# Patient Record
Sex: FEMALE | Race: WHITE | NOT HISPANIC OR LATINO | Employment: FULL TIME | URBAN - METROPOLITAN AREA
[De-identification: names, ages, dates, MRNs, and addresses within clinical notes are randomized per-mention and may not be internally consistent; named-entity substitution may affect disease eponyms.]

---

## 2020-08-01 ENCOUNTER — APPOINTMENT (OUTPATIENT)
Dept: CT IMAGING | Facility: CLINIC | Age: 55
End: 2020-08-01
Attending: EMERGENCY MEDICINE
Payer: COMMERCIAL

## 2020-08-01 ENCOUNTER — HOSPITAL ENCOUNTER (EMERGENCY)
Facility: CLINIC | Age: 55
Discharge: HOME | End: 2020-08-01
Attending: EMERGENCY MEDICINE | Admitting: EMERGENCY MEDICINE
Payer: COMMERCIAL

## 2020-08-01 VITALS
DIASTOLIC BLOOD PRESSURE: 80 MMHG | HEART RATE: 95 BPM | TEMPERATURE: 97.7 F | OXYGEN SATURATION: 100 % | SYSTOLIC BLOOD PRESSURE: 141 MMHG | RESPIRATION RATE: 18 BRPM

## 2020-08-01 DIAGNOSIS — S02.2XXB OPEN FRACTURE OF NASAL BONE, INITIAL ENCOUNTER: ICD-10-CM

## 2020-08-01 PROCEDURE — 99284 EMERGENCY DEPT VISIT MOD MDM: CPT | Mod: 25

## 2020-08-01 PROCEDURE — 25000128 H RX IP 250 OP 636: Performed by: EMERGENCY MEDICINE

## 2020-08-01 PROCEDURE — 21310 ZZHC CLOSED TX NASAL BONE FRACTURE W/O MANIPULATION: CPT

## 2020-08-01 PROCEDURE — 25000125 ZZHC RX 250: Performed by: EMERGENCY MEDICINE

## 2020-08-01 PROCEDURE — 27110038 ZZH RX 271: Performed by: EMERGENCY MEDICINE

## 2020-08-01 PROCEDURE — 90715 TDAP VACCINE 7 YRS/> IM: CPT | Performed by: EMERGENCY MEDICINE

## 2020-08-01 PROCEDURE — 90471 IMMUNIZATION ADMIN: CPT

## 2020-08-01 PROCEDURE — 25000132 ZZH RX MED GY IP 250 OP 250 PS 637: Performed by: EMERGENCY MEDICINE

## 2020-08-01 PROCEDURE — 12011 RPR F/E/E/N/L/M 2.5 CM/<: CPT | Mod: 59

## 2020-08-01 PROCEDURE — 70486 CT MAXILLOFACIAL W/O DYE: CPT

## 2020-08-01 RX ORDER — METHYLCELLULOSE 4000CPS 30 %
POWDER (GRAM) MISCELLANEOUS ONCE
Status: COMPLETED | OUTPATIENT
Start: 2020-08-01 | End: 2020-08-01

## 2020-08-01 RX ADMIN — AMOXICILLIN AND CLAVULANATE POTASSIUM 1 TABLET: 875; 125 TABLET, FILM COATED ORAL at 21:02

## 2020-08-01 RX ADMIN — CLOSTRIDIUM TETANI TOXOID ANTIGEN (FORMALDEHYDE INACTIVATED), CORYNEBACTERIUM DIPHTHERIAE TOXOID ANTIGEN (FORMALDEHYDE INACTIVATED), BORDETELLA PERTUSSIS TOXOID ANTIGEN (GLUTARALDEHYDE INACTIVATED), BORDETELLA PERTUSSIS FILAMENTOUS HEMAGGLUTININ ANTIGEN (FORMALDEHYDE INACTIVATED), BORDETELLA PERTUSSIS PERTACTIN ANTIGEN, AND BORDETELLA PERTUSSIS FIMBRIAE 2/3 ANTIGEN 0.5 ML: 5; 2; 2.5; 5; 3; 5 INJECTION, SUSPENSION INTRAMUSCULAR at 21:11

## 2020-08-01 RX ADMIN — Medication: at 21:02

## 2020-08-01 RX ADMIN — Medication: at 21:03

## 2020-08-01 ASSESSMENT — ENCOUNTER SYMPTOMS
WOUND: 1
NAUSEA: 0
VOMITING: 0

## 2020-08-01 NOTE — ED PROVIDER NOTES
History     Chief Complaint:  Facial Injury    HPI   Lulú Razo is a 55 year old female who presents with facial injury. The patient was walking up steps when she fell forward hitting the edge of the steps on the bridge of her nose. She states that she is unable to breath out her nose. The patient is unsure if she lost consciousness. The patient denies any nausea or vomiting.     Allergies:  Quinolones     Medications:    Medications reviewed. No current medications.     Past Medical History:    Medical history reviewed. No pertinent medical history.    Past Surgical History:    Surgical history reviewed. No pertinent surgical history.    Family History:    Family history reviewed. No pertinent family history.     Social History:  Current Tobacco user.      Review of Systems   Gastrointestinal: Negative for nausea and vomiting.   Skin: Positive for wound.   All other systems reviewed and are negative.      Physical Exam     Patient Vitals for the past 24 hrs:   BP Temp Temp src Pulse Resp SpO2   08/01/20 1816 (!) 142/108 97.7  F (36.5  C) Oral 104 18 98 %       Physical Exam  Vital signs reviewed.  Nursing note reviewed.  Constitutional: Well-developed, Well-nourished.  Non-diaphoretic.  Mild distress    HENT: Laceration to nasal bridge w/ obvious deformity.  No pain or instability to palpation of bony orbits, mandible, maxilla.  Good jaw opening.  No malocclusion.  No nasal septal hematoma.  OP clear and moist.    EYES:  PERRL.  EOMI.  NECK:  No Cspine tenderness.  Trachea midline.  Full ROM.  Supple. No stridor.  CARDIAC:  RRR.   CHEST:  No external evidence of chest trauma  PULM: Effort  Normal.  Breath sounds clear and equal bilat  ABD:  Soft, NT/ND No guarding, no rebound.   : No CVA T.    BACK:  No T or L spinous process tenderness.  Pelvis stable.  EXT:  Full ROM X4.  No tenderness, edema, crepitus or obvious deformity.    NEURO:  Alert, Oriented X3.  5/5 UE and LE, proximal and distal.  Sensation  intact to LT.   SKIN :  Warm.  Dry.   No erythema.  No rash  PSYCH.:  Normal judgment.  Normal affect.      Emergency Department Course     Imaging:  Radiology findings were communicated with the Patient who voiced understanding of the findings.    CT Facial Bones without Contrast  1.  Comminuted fractures of the nasal bones and fracture of the nasal   septum. Posterior displacement of the nasal bone fracture fragments.     Reading per radiology.    Interventions:  2101 Augmentin, 1 tablet, Oral  2111 Tdap, 0.5 mL, intramuscular    Procedures    Laceration Repair        LACERATION:  A simple clean 2 cm laceration.      LOCATION:  nasal bridge      ANESTHESIA:  LET - Topical      PREPARATION:  Irrigation and Scrubbing with Normal Saline, Betadine and Shur Clens      DEBRIDEMENT:  no debridement and wound explored, no foreign body found      CLOSURE:  Wound was closed with One Layer.  Skin closed with 4 x 5.0 Ethylon using interrupted sutures.      Emergency Department Course:     Nursing notes and vitals reviewed.    1820 I performed an exam of the patient as documented above.     1920 The patient was sent for a CT while in the emergency department, results above.     2016 I performed the laceration repair procedure as documented above.    1958 Findings and plan explained to the Patient. Patient discharged home with instructions regarding supportive care, medications, and reasons to return. The importance of close follow-up was reviewed.    Impression & Plan      Medical Decision Making:  Lulú Razo is a 55 year old female presenting w/ facial/nose injury s/p mechanical fall.     DDx includes mechanical fall, fracture, contusion, intracranial hemorrhage, skull fracture.  Doubt seizure, syncope, CVA given history and physical exam.  No other evidence of trauma on full primary and secondary trauma surveys other than areas imaged above or documented in physical exam.  Given mechanical fall and no other complaints,  EKG and blood work deferred. Imaging sig for nasal bone fractures as described above.  Interventions as noted above.  Laceration repaired as noted above.  Prophylactic abx given 2ndary to open fracture.  At this time I feel the pt is safe for discharge.  Recommendations given regarding follow up with ENT for reduction and suture removal, PCP if unable to see ENT in 7 days, and return to the emergency department as needed for new or worsening symptoms.  Pt and  counseled on all results, disposition and diagnosis.  They are understanding and agreeable to plan. Patient discharged in stable condition.         Diagnosis:    ICD-10-CM    1. Open fracture of nasal bone, initial encounter  S02.2XXB        Disposition:   Patient is discharged home.     Scribe Disclosure:  I, Jessica Moncada, am serving as a scribe at 6:23 PM on 8/1/2020 to document services personally performed by Farrukh Nelson MD based on my observations and the provider's statements to me.  [facility] EMERGENCY DEPARTMENT       Farrukh Nelson MD  08/02/20 4181

## 2020-08-01 NOTE — ED AVS SNAPSHOT
[facility] Emergency Department  [address]  [facility] [address]  Phone:  [phone]  Fax:  [phone]                                    Lulú Razo   MRN: [id number]    Department:  [facility] Emergency Department   Date of Visit:  8/1/2020           After Visit Summary Signature Page    I have received my discharge instructions, and my questions have been answered. I have discussed any challenges I see with this plan with the nurse or doctor.    ..........................................................................................................................................  Patient/Patient Representative Signature      ..........................................................................................................................................  Patient Representative Print Name and Relationship to Patient    ..................................................               ................................................  Date                                   Time    ..........................................................................................................................................  Reviewed by Signature/Title    ...................................................              ..............................................  Date                                               Time          22EPIC Rev 08/18

## 2020-08-01 NOTE — ED TRIAGE NOTES
Patient comes in for evaluation of injury to the nose. Patient tripped and fell forward striking bridge of nose on the corner of a cement step. Patient does not think she lost consciousness. Nose is deformed. ABCs intact.

## 2020-08-02 NOTE — DISCHARGE INSTRUCTIONS
1. Do not get laceration wet for 24 to 48 hours.  After, you may wash gently with warm soapy water.  Do not submerge laceration (ex. Bath tub, pool) until the sutures are removed.  2. You may use ice as needed for swelling.  3. -Take acetaminophen 500 to 1000 mg by mouth every 4 to 6 hours as needed for pain or fever.  Do not take more than 4000 mg in 24 hours.  Do not take within 6 hours of another acetaminophen containing medication such as norco (vicodin) or percocet.  - Take ibuprofen 600 to 800 mg by mouth every 6 to 8 hours as needed for pain or fever  4. Please follow-up in clinic in 10 days for suture removal. There were 4 sutures placed.  5. Please return to the ED as needed for new or worsening symptoms such as redness to surrounding tissue, vomiting and unable to keep anything down, severe and uncontrollable headache, confusion, draining pus, fever, multiple episodes of vomiting, any other concerning symptoms.    Please apply high SPF sunscreen (50 or higher) to laceration for 3-6 months after healing to help prevent scar formation.      Discharge Instructions  Laceration (Cut)    You were seen today for a laceration (cut).  Your provider examined your laceration for any problems such a buried foreign body (like glass, a splinter, or gravel), or injury to blood vessels, tendons, and nerves.  Your provider may have also rinsed and/or scrubbed your laceration to help prevent an infection. It may not be possible to find all problems with your laceration on the first visit; occasionally foreign bodies or a tendon injury can go undetected.    Your laceration may have been closed in one of several ways:    Stitches: regular stitches that require removal.      A small percentage of wounds will develop an infection regardless of how well the wound is cared for. Antibiotics are generally not indicated to prevent an infection so are only given for a small number of high-risk wounds. Some lacerations are too high  risk to close, and are left open to heal because closure can increase the likelihood that an infection will develop.    Remember that all lacerations, no matter how expertly repaired, will cause scarring. We consider many factors, techniques, and materials, in our efforts to provide the best possible cosmetic outcome.    Generally, every Emergency Department visit should have a follow-up clinic visit with either a primary or a specialty clinic/provider. Please follow-up as instructed by your emergency provider today.     Return to the Emergency Department right away if:    You have more redness, swelling, pain, drainage (pus), a bad smell, or red streaking from your laceration as these symptoms could indicate an infection.    You have a fever of 100.4 F or more.    You have bleeding that you cannot stop at home. If your cut starts to bleed, hold pressure on the bleeding area with a clean cloth or put pressure over the bandage.  If the bleeding does not stop after using constant pressure for 30 minutes, you should return to the Emergency Department for further treatment.    Return to the Emergency Department or see your regular provider if:    The laceration starts to come open.     You have something coming out of the cut or a feeling that there is something in the laceration.    Your wound will not heal, or keeps breaking open. There can always be glass, wood, dirt or other things in any wound.  They will not always show up, even on x-rays.  If a wound does not heal, this may be why, and it is important to follow-up with your regular provider.    Home Care:    Take your dressing off in 12-24 hours, or as instructed by your provider, to check your laceration. Remove the dressing sooner if it seems too tight or painful, or if it is getting numb, tingly, or pale past the dressing.    Gently wash your laceration 1-2 times daily with clean water and mild soap. It is okay to shower or run clean water over the laceration,  but do not let the laceration soak in water (no swimming).    For all other repairs: after you wash your laceration, or at least 2 times a day, apply antibiotic ointment (such as Neosporin  or Bacitracin ) to the laceration, then cover it with a Band-Aid  or gauze.Keep the laceration clean.     Follow-up for removal:    If your wound was closed with staples or regular stitches, they need to be removed according to the instructions and timeline specified by your provider today.    If your wound was closed with absorbable ( dissolving ) sutures, they should fall out, dissolve, or not be visible in about one week. If they are still visible, then they should be removed according to the instructions and timeline specified by your provider today.    Scars:  To help minimize scarring:    Wear sunscreen over the healed laceration when out in the sun.    Massage the area regularly once healed.    You may apply Vitamin E to the healed wound.    Wait. Scars improve in appearance over months and years.    If you were given a prescription for medicine here today, be sure to read all of the information (including the package insert) that comes with your prescription.  This will include important information about the medicine, its side effects, and any warnings that you need to know about.  The pharmacist who fills the prescription can provide more information and answer questions you may have about the medicine.  If you have questions or concerns that the pharmacist cannot address, please call or return to the Emergency Department.       Remember that you can always come back to the Emergency Department if you are not able to see your regular provider in the amount of time listed above, if you get any new symptoms, or if there is anything that worries you.

## 2020-08-11 ENCOUNTER — OFFICE VISIT (OUTPATIENT)
Dept: FAMILY MEDICINE | Facility: CLINIC | Age: 55
End: 2020-08-11
Payer: COMMERCIAL

## 2020-08-11 VITALS
WEIGHT: 202 LBS | OXYGEN SATURATION: 95 % | TEMPERATURE: 98.6 F | DIASTOLIC BLOOD PRESSURE: 90 MMHG | HEART RATE: 98 BPM | SYSTOLIC BLOOD PRESSURE: 128 MMHG

## 2020-08-11 DIAGNOSIS — Z01.818 PREOP GENERAL PHYSICAL EXAM: Primary | ICD-10-CM

## 2020-08-11 DIAGNOSIS — S02.2XXB OPEN FRACTURE OF NASAL BONE, INITIAL ENCOUNTER: ICD-10-CM

## 2020-08-11 LAB — HGB BLD-MCNC: 14.8 G/DL (ref 11.7–15.7)

## 2020-08-11 PROCEDURE — 99203 OFFICE O/P NEW LOW 30 MIN: CPT | Performed by: FAMILY MEDICINE

## 2020-08-11 PROCEDURE — 36415 COLL VENOUS BLD VENIPUNCTURE: CPT | Performed by: FAMILY MEDICINE

## 2020-08-11 PROCEDURE — 85018 HEMOGLOBIN: CPT | Performed by: FAMILY MEDICINE

## 2020-08-11 NOTE — PROGRESS NOTES
[facility]  [address] [address]  [phone]  Dept: [phone]    PRE-OP EVALUATION:  Today's date: 2020    Lulú Razo (: 1965) presents for pre-operative evaluation assessment as requested by Dr. Nova.  She requires evaluation and anesthesia risk assessment prior to undergoing surgery/procedure for treatment of nasal fracture.    Proposed Surgery/ Procedure: repair nasal fracture  Date of Surgery/ Procedure: 2020  Time of Surgery/ Procedure: [facility]/Surgical Facility: [facility]  Surgery Fax Number: Note does not need to be faxed, will be available electronically in Epic.  Primary Physician: Ashley Mckay Bryant  Type of Anesthesia Anticipated: to be determined    Preoperative Questionnaire:   No - Have you ever had a heart attack or stroke?  No - Have you ever had surgery on your heart or blood vessels, such as a stent, coronary (heart) bypass, or surgery on an artery in the head, neck, heart, or legs?  No - Do you have chest pain when you are physically active?  No - Do you have a history of heart failure?  no - Do you currently have a cold, bronchitis, or symptoms of other respiratory (head and chest) infections? Neg COVID test 8/10/2020  no - Do you have a cough, shortness of breath, or wheezing?  No - Do you or anyone in your family have a history of blood clots?  No - Do you or anyone in your family have a serious bleeding problem, such as long-lasting bleeding after surgeries or cuts?  No - Have you ever had anemia or been told to take iron pills?  No - Have you had any abnormal blood loss such as black, tarry or bloody stools, or abnormal vaginal bleeding?  No - Have you ever had a blood transfusion?  Yes - Are you willing to have a blood transfusion if it is medically needed before, during, or after your surgery?  YES - Have you or anyone in your family ever had problems with anesthesia (sedation for  surgery)?  no - Do you have sleep apnea, excessive snoring, or daytime drowsiness?   No - Do you have any artifical heart valves or other implanted medical devices, such as a pacemaker, defibrillator, or continuous glucose monitor?  No - Do you have any artifical joints?  No - Are you allergic to latex?  No - Is there any chance that you may be pregnant?    Patient has a Health Care Directive or Living Will:  NO    HPI:     HPI related to upcoming procedure:       See problem list for active medical problems.  Problems all longstanding and stable, except as noted/documented.  See ROS for pertinent symptoms related to these conditions.      MEDICAL HISTORY:   There are no active problems to display for this patient.     No past medical history on file.  No past surgical history on file.  No current outpatient medications on file.     OTC products: None, except as noted above    Allergies   Allergen Reactions     Quinolones Hives      Latex Allergy: NO    Social History     Tobacco Use     Smoking status: Current Every Day Smoker     Smokeless tobacco: Never Used   Substance Use Topics     Alcohol use: Yes     History   Drug Use Unknown       REVIEW OF SYSTEMS:   CONSTITUTIONAL: NEGATIVE for fever, chills, change in weight  ENT/MOUTH: NEGATIVE for ear, mouth and throat problems  RESP: NEGATIVE for significant cough or SOB  CV: NEGATIVE for chest pain, palpitations or peripheral edema    EXAM:   BP (!) 128/90   Pulse 98   Temp 98.6  F (37  C) (Tympanic)   Wt 91.6 kg (202 lb)   SpO2 95%   GENERAL APPEARANCE: healthy, alert and no distress  HENT: ear canals and TM's normal and nose and mouth without ulcers or lesions  RESP: lungs clear to auscultation - no rales, rhonchi or wheezes  CV: regular rate and rhythm, normal S1 S2, no S3 or S4 and no murmur, click or rub   ABDOMEN: soft, nontender, no HSM or masses and bowel sounds normal  NEURO: Normal strength and tone, sensory exam grossly normal, mentation intact and  speech normal    DIAGNOSTICS:   Hemoglobin done   NO EKG indicated    IMPRESSION:   Reason for surgery/procedure:   Diagnosis/reason for consult:     ICD-10-CM    1. Preop general physical exam  Z01.818 Hemoglobin   2. Open fracture of nasal bone, initial encounter  S02.2XXB Hemoglobin         The proposed surgical procedure is considered LOW risk.    REVISED CARDIAC RISK INDEX  The patient has the following serious cardiovascular risks for perioperative complications such as (MI, PE, VFib and 3  AV Block):  No serious cardiac risks  INTERPRETATION: 0 risks: Class I (very low risk - 0.4% complication rate)    The patient has the following additional risks for perioperative complications:  No identified additional risks      ICD-10-CM    1. Preop general physical exam  Z01.818 Hemoglobin   2. Open fracture of nasal bone, initial encounter  S02.2XXB Hemoglobin       RECOMMENDATIONS:         APPROVAL GIVEN to proceed with proposed procedure, without further diagnostic evaluation       Signed Electronically by: Madi Jane MD    Copy of this evaluation report is provided to requesting physician.    [facility] Preop Guidelines    Revised Cardiac Risk Index

## 2020-12-19 ENCOUNTER — HOSPITAL ENCOUNTER (OUTPATIENT)
Facility: CLINIC | Age: 55
Setting detail: OBSERVATION
Discharge: HOME | End: 2020-12-20
Attending: EMERGENCY MEDICINE | Admitting: INTERNAL MEDICINE
Payer: COMMERCIAL

## 2020-12-19 ENCOUNTER — APPOINTMENT (OUTPATIENT)
Dept: GENERAL RADIOLOGY | Facility: CLINIC | Age: 55
End: 2020-12-19
Attending: EMERGENCY MEDICINE
Payer: COMMERCIAL

## 2020-12-19 DIAGNOSIS — M65.949 FLEXOR TENOSYNOVITIS OF FINGER: ICD-10-CM
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
## 2020-12-19 DIAGNOSIS — S61.259A CAT BITE OF FINGER, INITIAL ENCOUNTER: ICD-10-CM

## 2020-12-19 LAB
ANION GAP SERPL CALCULATED.3IONS-SCNC: 4 MMOL/L (ref 3–14)
BASOPHILS # BLD AUTO: 0 10E9/L (ref 0–0.2)
BASOPHILS NFR BLD AUTO: 0.2 %
BUN SERPL-MCNC: 11 MG/DL (ref 7–30)
CALCIUM SERPL-MCNC: 8.6 MG/DL (ref 8.5–10.1)
CHLORIDE SERPL-SCNC: 108 MMOL/L (ref 94–109)
CO2 SERPL-SCNC: 28 MMOL/L (ref 20–32)
CREAT SERPL-MCNC: 0.62 MG/DL (ref 0.52–1.04)
DIFFERENTIAL METHOD BLD: ABNORMAL
EOSINOPHIL # BLD AUTO: 0.1 10E9/L (ref 0–0.7)
EOSINOPHIL NFR BLD AUTO: 1 %
ERYTHROCYTE [DISTWIDTH] IN BLOOD BY AUTOMATED COUNT: 11.9 % (ref 10–15)
FLUAV+FLUBV RNA SPEC QL NAA+PROBE: NEGATIVE
FLUAV+FLUBV RNA SPEC QL NAA+PROBE: NEGATIVE
GFR SERPL CREATININE-BSD FRML MDRD: >90 ML/MIN/{1.73_M2}
GLUCOSE SERPL-MCNC: 155 MG/DL (ref 70–99)
HCG SERPL QL: NEGATIVE
HCT VFR BLD AUTO: 41.3 % (ref 35–47)
HGB BLD-MCNC: 13.7 G/DL (ref 11.7–15.7)
IMM GRANULOCYTES # BLD: 0 10E9/L (ref 0–0.4)
IMM GRANULOCYTES NFR BLD: 0.3 %
LABORATORY COMMENT REPORT: NORMAL
LYMPHOCYTES # BLD AUTO: 2.6 10E9/L (ref 0.8–5.3)
LYMPHOCYTES NFR BLD AUTO: 22.7 %
MCH RBC QN AUTO: 31.6 PG (ref 26.5–33)
MCHC RBC AUTO-ENTMCNC: 33.2 G/DL (ref 31.5–36.5)
MCV RBC AUTO: 95 FL (ref 78–100)
MONOCYTES # BLD AUTO: 0.6 10E9/L (ref 0–1.3)
MONOCYTES NFR BLD AUTO: 5.5 %
NEUTROPHILS # BLD AUTO: 7.9 10E9/L (ref 1.6–8.3)
NEUTROPHILS NFR BLD AUTO: 70.3 %
NRBC # BLD AUTO: 0 10*3/UL
NRBC BLD AUTO-RTO: 0 /100
PLATELET # BLD AUTO: 205 10E9/L (ref 150–450)
POTASSIUM SERPL-SCNC: 3.6 MMOL/L (ref 3.4–5.3)
RBC # BLD AUTO: 4.34 10E12/L (ref 3.8–5.2)
RSV RNA SPEC QL NAA+PROBE: NEGATIVE
SARS-COV-2 RNA SPEC QL NAA+PROBE: NEGATIVE
SODIUM SERPL-SCNC: 140 MMOL/L (ref 133–144)
SPECIMEN SOURCE: NORMAL
WBC # BLD AUTO: 11.2 10E9/L (ref 4–11)

## 2020-12-19 PROCEDURE — 99285 EMERGENCY DEPT VISIT HI MDM: CPT | Mod: 25

## 2020-12-19 PROCEDURE — 96365 THER/PROPH/DIAG IV INF INIT: CPT

## 2020-12-19 PROCEDURE — 99220 PR INITIAL OBSERVATION CARE,LEVEL III: CPT | Performed by: PHYSICIAN ASSISTANT

## 2020-12-19 PROCEDURE — 84703 CHORIONIC GONADOTROPIN ASSAY: CPT | Performed by: EMERGENCY MEDICINE

## 2020-12-19 PROCEDURE — G0378 HOSPITAL OBSERVATION PER HR: HCPCS

## 2020-12-19 PROCEDURE — 85025 COMPLETE CBC W/AUTO DIFF WBC: CPT | Performed by: EMERGENCY MEDICINE

## 2020-12-19 PROCEDURE — 87636 SARSCOV2 & INF A&B AMP PRB: CPT | Performed by: EMERGENCY MEDICINE

## 2020-12-19 PROCEDURE — C9803 HOPD COVID-19 SPEC COLLECT: HCPCS

## 2020-12-19 PROCEDURE — 258N000003 HC RX IP 258 OP 636: Performed by: PHYSICIAN ASSISTANT

## 2020-12-19 PROCEDURE — 80048 BASIC METABOLIC PNL TOTAL CA: CPT | Performed by: EMERGENCY MEDICINE

## 2020-12-19 PROCEDURE — 96366 THER/PROPH/DIAG IV INF ADDON: CPT

## 2020-12-19 PROCEDURE — 73140 X-RAY EXAM OF FINGER(S): CPT | Mod: LT

## 2020-12-19 PROCEDURE — 250N000011 HC RX IP 250 OP 636: Performed by: EMERGENCY MEDICINE

## 2020-12-19 RX ORDER — NALOXONE HYDROCHLORIDE 0.4 MG/ML
0.4 INJECTION, SOLUTION INTRAMUSCULAR; INTRAVENOUS; SUBCUTANEOUS
Status: DISCONTINUED | OUTPATIENT
Start: 2020-12-19 | End: 2020-12-20 | Stop reason: HOSPADM

## 2020-12-19 RX ORDER — IBUPROFEN 600 MG/1
600 TABLET, FILM COATED ORAL EVERY 6 HOURS PRN
Status: DISCONTINUED | OUTPATIENT
Start: 2020-12-19 | End: 2020-12-20 | Stop reason: HOSPADM

## 2020-12-19 RX ORDER — HYDROMORPHONE HYDROCHLORIDE 1 MG/ML
0.5 INJECTION, SOLUTION INTRAMUSCULAR; INTRAVENOUS; SUBCUTANEOUS ONCE
Status: DISCONTINUED | OUTPATIENT
Start: 2020-12-19 | End: 2020-12-19

## 2020-12-19 RX ORDER — OXYCODONE HYDROCHLORIDE 5 MG/1
5-10 TABLET ORAL
Status: DISCONTINUED | OUTPATIENT
Start: 2020-12-19 | End: 2020-12-20 | Stop reason: HOSPADM

## 2020-12-19 RX ORDER — AMPICILLIN AND SULBACTAM 2; 1 G/1; G/1
3 INJECTION, POWDER, FOR SOLUTION INTRAMUSCULAR; INTRAVENOUS EVERY 6 HOURS
Status: DISCONTINUED | OUTPATIENT
Start: 2020-12-20 | End: 2020-12-20

## 2020-12-19 RX ORDER — AMOXICILLIN 250 MG
1 CAPSULE ORAL 2 TIMES DAILY PRN
Status: DISCONTINUED | OUTPATIENT
Start: 2020-12-19 | End: 2020-12-20 | Stop reason: HOSPADM

## 2020-12-19 RX ORDER — SODIUM CHLORIDE, SODIUM LACTATE, POTASSIUM CHLORIDE, CALCIUM CHLORIDE 600; 310; 30; 20 MG/100ML; MG/100ML; MG/100ML; MG/100ML
INJECTION, SOLUTION INTRAVENOUS CONTINUOUS
Status: DISCONTINUED | OUTPATIENT
Start: 2020-12-19 | End: 2020-12-20

## 2020-12-19 RX ORDER — ACETAMINOPHEN 325 MG/1
975 TABLET ORAL 3 TIMES DAILY
Status: DISCONTINUED | OUTPATIENT
Start: 2020-12-20 | End: 2020-12-20 | Stop reason: HOSPADM

## 2020-12-19 RX ORDER — ACETAMINOPHEN 650 MG/1
650 SUPPOSITORY RECTAL EVERY 4 HOURS PRN
Status: DISCONTINUED | OUTPATIENT
Start: 2020-12-19 | End: 2020-12-20 | Stop reason: HOSPADM

## 2020-12-19 RX ORDER — AMOXICILLIN 250 MG
2 CAPSULE ORAL 2 TIMES DAILY PRN
Status: DISCONTINUED | OUTPATIENT
Start: 2020-12-19 | End: 2020-12-20 | Stop reason: HOSPADM

## 2020-12-19 RX ORDER — ONDANSETRON 2 MG/ML
4 INJECTION INTRAMUSCULAR; INTRAVENOUS EVERY 6 HOURS PRN
Status: DISCONTINUED | OUTPATIENT
Start: 2020-12-19 | End: 2020-12-20 | Stop reason: HOSPADM

## 2020-12-19 RX ORDER — LORAZEPAM 2 MG/ML
0.5 INJECTION INTRAMUSCULAR EVERY 6 HOURS PRN
Status: DISCONTINUED | OUTPATIENT
Start: 2020-12-19 | End: 2020-12-20 | Stop reason: HOSPADM

## 2020-12-19 RX ORDER — ONDANSETRON 4 MG/1
4 TABLET, ORALLY DISINTEGRATING ORAL EVERY 6 HOURS PRN
Status: DISCONTINUED | OUTPATIENT
Start: 2020-12-19 | End: 2020-12-20 | Stop reason: HOSPADM

## 2020-12-19 RX ORDER — POLYETHYLENE GLYCOL 3350 17 G/17G
17 POWDER, FOR SOLUTION ORAL DAILY PRN
Status: DISCONTINUED | OUTPATIENT
Start: 2020-12-19 | End: 2020-12-20 | Stop reason: HOSPADM

## 2020-12-19 RX ORDER — PROCHLORPERAZINE MALEATE 10 MG
10 TABLET ORAL EVERY 6 HOURS PRN
Status: DISCONTINUED | OUTPATIENT
Start: 2020-12-19 | End: 2020-12-20 | Stop reason: HOSPADM

## 2020-12-19 RX ORDER — NALOXONE HYDROCHLORIDE 0.4 MG/ML
0.2 INJECTION, SOLUTION INTRAMUSCULAR; INTRAVENOUS; SUBCUTANEOUS
Status: DISCONTINUED | OUTPATIENT
Start: 2020-12-19 | End: 2020-12-20 | Stop reason: HOSPADM

## 2020-12-19 RX ORDER — PROCHLORPERAZINE 25 MG
25 SUPPOSITORY, RECTAL RECTAL EVERY 12 HOURS PRN
Status: DISCONTINUED | OUTPATIENT
Start: 2020-12-19 | End: 2020-12-20 | Stop reason: HOSPADM

## 2020-12-19 RX ORDER — HYDROMORPHONE HYDROCHLORIDE 1 MG/ML
0.3 INJECTION, SOLUTION INTRAMUSCULAR; INTRAVENOUS; SUBCUTANEOUS
Status: DISCONTINUED | OUTPATIENT
Start: 2020-12-19 | End: 2020-12-20 | Stop reason: HOSPADM

## 2020-12-19 RX ORDER — AMPICILLIN AND SULBACTAM 2; 1 G/1; G/1
3 INJECTION, POWDER, FOR SOLUTION INTRAMUSCULAR; INTRAVENOUS ONCE
Status: COMPLETED | OUTPATIENT
Start: 2020-12-19 | End: 2020-12-19

## 2020-12-19 RX ORDER — LABETALOL 20 MG/4 ML (5 MG/ML) INTRAVENOUS SYRINGE
10
Status: DISCONTINUED | OUTPATIENT
Start: 2020-12-19 | End: 2020-12-20 | Stop reason: HOSPADM

## 2020-12-19 RX ORDER — LIDOCAINE 40 MG/G
CREAM TOPICAL
Status: DISCONTINUED | OUTPATIENT
Start: 2020-12-19 | End: 2020-12-20 | Stop reason: HOSPADM

## 2020-12-19 RX ADMIN — AMPICILLIN SODIUM AND SULBACTAM SODIUM 3 G: 2; 1 INJECTION, POWDER, FOR SOLUTION INTRAMUSCULAR; INTRAVENOUS at 20:57

## 2020-12-19 RX ADMIN — SODIUM CHLORIDE, POTASSIUM CHLORIDE, SODIUM LACTATE AND CALCIUM CHLORIDE: 600; 310; 30; 20 INJECTION, SOLUTION INTRAVENOUS at 23:45

## 2020-12-19 ASSESSMENT — ENCOUNTER SYMPTOMS
VOMITING: 0
WOUND: 1
FEVER: 0
MYALGIAS: 0

## 2020-12-20 VITALS
TEMPERATURE: 98.3 F | DIASTOLIC BLOOD PRESSURE: 68 MMHG | RESPIRATION RATE: 18 BRPM | HEART RATE: 85 BPM | SYSTOLIC BLOOD PRESSURE: 110 MMHG | OXYGEN SATURATION: 98 %

## 2020-12-20 LAB
ANION GAP SERPL CALCULATED.3IONS-SCNC: 2 MMOL/L (ref 3–14)
BUN SERPL-MCNC: 8 MG/DL (ref 7–30)
CALCIUM SERPL-MCNC: 8.7 MG/DL (ref 8.5–10.1)
CHLORIDE SERPL-SCNC: 108 MMOL/L (ref 94–109)
CO2 SERPL-SCNC: 29 MMOL/L (ref 20–32)
CREAT SERPL-MCNC: 0.6 MG/DL (ref 0.52–1.04)
ERYTHROCYTE [DISTWIDTH] IN BLOOD BY AUTOMATED COUNT: 11.9 % (ref 10–15)
GFR SERPL CREATININE-BSD FRML MDRD: >90 ML/MIN/{1.73_M2}
GLUCOSE SERPL-MCNC: 109 MG/DL (ref 70–99)
HCT VFR BLD AUTO: 38.8 % (ref 35–47)
HGB BLD-MCNC: 12.8 G/DL (ref 11.7–15.7)
MCH RBC QN AUTO: 31.4 PG (ref 26.5–33)
MCHC RBC AUTO-ENTMCNC: 33 G/DL (ref 31.5–36.5)
MCV RBC AUTO: 95 FL (ref 78–100)
PLATELET # BLD AUTO: 183 10E9/L (ref 150–450)
POTASSIUM SERPL-SCNC: 4 MMOL/L (ref 3.4–5.3)
RBC # BLD AUTO: 4.07 10E12/L (ref 3.8–5.2)
SODIUM SERPL-SCNC: 139 MMOL/L (ref 133–144)
WBC # BLD AUTO: 9.4 10E9/L (ref 4–11)

## 2020-12-20 PROCEDURE — 80048 BASIC METABOLIC PNL TOTAL CA: CPT | Performed by: PHYSICIAN ASSISTANT

## 2020-12-20 PROCEDURE — 96376 TX/PRO/DX INJ SAME DRUG ADON: CPT

## 2020-12-20 PROCEDURE — G0378 HOSPITAL OBSERVATION PER HR: HCPCS

## 2020-12-20 PROCEDURE — 99217 PR OBSERVATION CARE DISCHARGE: CPT | Performed by: INTERNAL MEDICINE

## 2020-12-20 PROCEDURE — 36415 COLL VENOUS BLD VENIPUNCTURE: CPT | Performed by: PHYSICIAN ASSISTANT

## 2020-12-20 PROCEDURE — 250N000011 HC RX IP 250 OP 636: Performed by: PHYSICIAN ASSISTANT

## 2020-12-20 PROCEDURE — 85027 COMPLETE CBC AUTOMATED: CPT | Performed by: PHYSICIAN ASSISTANT

## 2020-12-20 PROCEDURE — 250N000013 HC RX MED GY IP 250 OP 250 PS 637: Performed by: PHYSICIAN ASSISTANT

## 2020-12-20 RX ORDER — AMOXICILLIN 250 MG
1 CAPSULE ORAL 2 TIMES DAILY PRN
Qty: 10 TABLET | Refills: 0 | Status: SHIPPED | OUTPATIENT
Start: 2020-12-20 | End: 2021-02-09

## 2020-12-20 RX ORDER — OXYCODONE HYDROCHLORIDE 5 MG/1
5 TABLET ORAL EVERY 6 HOURS PRN
Qty: 6 TABLET | Refills: 0 | Status: SHIPPED | OUTPATIENT
Start: 2020-12-20 | End: 2021-02-09

## 2020-12-20 RX ADMIN — AMPICILLIN SODIUM AND SULBACTAM SODIUM 3 G: 2; 1 INJECTION, POWDER, FOR SOLUTION INTRAMUSCULAR; INTRAVENOUS at 03:04

## 2020-12-20 RX ADMIN — ACETAMINOPHEN 975 MG: 325 TABLET, FILM COATED ORAL at 09:14

## 2020-12-20 RX ADMIN — AMPICILLIN SODIUM AND SULBACTAM SODIUM 3 G: 2; 1 INJECTION, POWDER, FOR SOLUTION INTRAMUSCULAR; INTRAVENOUS at 09:13

## 2020-12-20 ASSESSMENT — ACTIVITIES OF DAILY LIVING (ADL)
HEARING_DIFFICULTY_OR_DEAF: NO
CONCENTRATING,_REMEMBERING_OR_MAKING_DECISIONS_DIFFICULTY: NO
VISION_MANAGEMENT: GLASSES
WEAR_GLASSES_OR_BLIND: YES
FALL_HISTORY_WITHIN_LAST_SIX_MONTHS: YES
WALKING_OR_CLIMBING_STAIRS_DIFFICULTY: NO
NUMBER_OF_TIMES_PATIENT_HAS_FALLEN_WITHIN_LAST_SIX_MONTHS: 1
DIFFICULTY_EATING/SWALLOWING: NO
TOILETING_ISSUES: NO
DRESSING/BATHING_DIFFICULTY: NO
DOING_ERRANDS_INDEPENDENTLY_DIFFICULTY: NO
DIFFICULTY_COMMUNICATING: NO

## 2020-12-20 NOTE — PROGRESS NOTES
Patient discharged from department walking. AVS read to patient and all questions and concerns addressed. Patient educated on signs and symptoms of infection, when to call the doctor, and medications. Patient to follow up with ortho in 5 days. Patient educated on care and dressing changes.

## 2020-12-20 NOTE — CONSULTS
Consult Date:  12/20/2020      HISTORY OF PRESENT ILLNESS:  A 55-year-old right-hand dominant woman, sustaining injury to her left index finger from a cat bite.  Her cat was being put down by a .  During the sedation, the cat became agitated and bit across the area just proximal to the PIP joint.  Over the several hours, she then developed pain, swelling, induration, and erythema.  She was brought to the emergency room, placed on IV antibiotics and admitted overnight.      She reports overnight, the pain has subsided considerably.  Redness and swelling feels diminished.  She is able to move the fingers much better.  She does feel that there is some pressure in the localized area of puncture wounds that has some blistering with purulent and a little hemorrhage underneath, suggestive of the localized, more superficial abscess.  Movement with flexor tendon is tolerated it quite well.  She can come almost down to 80 degrees and near full extension.  No tenderness in the palm.  A little bit of induration and erythema localized along the area of the puncture wounds.      PAST MEDICAL HISTORY:  Otherwise healthy.      ALLERGIES:  QUINOLONES.        She has been afebrile.  Pictures were reviewed from yesterday.      PLAN:  Using sterile technique, the area of the blisters was prepped, and an 18-gauge needle was used to unroof the blisters.  Each blister was unroofed with a scab.  In the superficial skin beneath was some purulence, and purulence was expressed from the area.  Once this was expressed and decompressed, she felt better.  Again, she was able to move it fairly well.  These areas were left open, and she will begin b.i.d. soaks in Hibiclens, try to maintain the area opened, and dressing changes with Adaptic and Coban for edema control.  She has responded well to the antibiotics overnight and likely can be discharged on Augmentin with close followup in clinic.  Questions were answered and will continue to  monitor closely.         STUART BRIDGES MD             D: 2020   T: 2020   MT:       Name:     ARNULFO ROWE   MRN:      [id number]        Account:       [id number]   :      1965           Consult Date:  2020      Document: [id number]

## 2020-12-20 NOTE — DISCHARGE SUMMARY
[facility]  Hospitalist Discharge Summary      Date of Admission:  12/19/2020  Date of Discharge:  12/20/2020  Discharging Provider: Nba Foy DO      Discharge Diagnoses   1.  Cat bite injury to left second finger.  2.  Cat bite cellulitis.  3.  Concern for left second finger tenosynovitis.    Follow-ups Needed After Discharge   Follow-up Appointments     Follow-up and recommended labs and tests       Follow up with orthopedic surgery, within 5 days for hospital follow- up.             Discharge Disposition   Discharged to home  Condition at discharge: Stable      Hospital Course   Lulú Razo is a 55 year old female without prior medical history who presents with left second finger infection due to cat bite.  There was concern for possible left second finger tenosynovitis.  Was placed in the hospital and started on antibiotics with IV Unasyn.  Seen in consultation by orthopedic surgery.  Orthopedic surgery did do bedside incision and drainage.  It is recommended that she transition to oral antibiotics with Augmentin.  Start Augmentin 875/125 mg twice a day for the next 7 days.  Follow-up with orthopedic surgery in the outpatient setting within the next 5 days.    Consultations This Hospital Stay   ORTHOPEDIC SURGERY IP CONSULT    Code Status   Full Code    Time Spent on this Encounter   I spent 20 minutes with Ms. Razo and working on discharge on 12/20/2020.       Nba Foy DO  [facility]  [address] [place] [address]  Phone: [phone]  Fax: [phone]  ______________________________________________________________________    Physical Exam   Vital Signs: Temp: 98.3  F (36.8  C) Temp src: Oral BP: 110/68 Pulse: 85   Resp: 18 SpO2: 98 %      Weight: 0 lbs 0 oz  Gen:  NAD, A&Ox3.  Eyes:  PERRL, sclera anicteric.  OP:  MMM, no lesions.  Neck:  Supple.  CV:  Regular, no murmurs.  Lung:  CTA b/l, normal effort.  Ab:  +BS,  soft.  Skin:  Warm, dry to touch.  Left second finger erythema and swelling.  Ext:  No pitting edema LE b/l.         Primary Care Physician   [facility]    Discharge Orders      Reason for your hospital stay    Cat bite cellulitis.     Follow-up and recommended labs and tests     Follow up with orthopedic surgery, within 5 days for hospital follow- up.     Activity    Your activity upon discharge: activity as tolerated     Full Code     Diet    Follow this diet upon discharge:      Regular Diet Adult           Discharge Medications   Current Discharge Medication List      START taking these medications    Details   amoxicillin-clavulanate (AUGMENTIN) 875-125 MG tablet Take 1 tablet by mouth every 12 hours for 7 days  Qty: 14 tablet, Refills: 0    Associated Diagnoses: Cat bite of finger, initial encounter      oxyCODONE (ROXICODONE) 5 MG tablet Take 1 tablet (5 mg) by mouth every 6 hours as needed for moderate to severe pain (pain control or improvement in physical function.)  Qty: 6 tablet, Refills: 0    Associated Diagnoses: Cat bite of finger, initial encounter      senna-docusate (SENOKOT-S/PERICOLACE) 8.6-50 MG tablet Take 1 tablet by mouth 2 times daily as needed for constipation  Qty: 10 tablet, Refills: 0    Associated Diagnoses: Cat bite of finger, initial encounter           Allergies   Allergies   Allergen Reactions     Quinolones Hives

## 2020-12-20 NOTE — PLAN OF CARE
Pt admitted from ED d/t infected cat bite on 2nd L finger. VSS, max temp 99.6 oral. States finger is throbbing but denies need for pain meds. Wound marked, not extended. Wound continues to appear swollen, red and warm to touch with scant clear drainage. Pt elevated and iced finger overnight. Scheduled IV ABX given. Pt able to rest overnight. Independent with self cares.

## 2020-12-20 NOTE — ED NOTES
[facility]  ED Nurse Handoff Report    Lulú Razo is a 55 year old female   ED Chief complaint: Cat Bite  . ED Diagnosis:   Final diagnoses:   Flexor tenosynovitis of finger   Cat bite of finger, initial encounter     Allergies:   Allergies   Allergen Reactions     Quinolones Hives       Code Status: Full Code  Activity level - Baseline/Home:  Independent. Activity Level - Current:   Independent. Lift room needed: No. Bariatric: No   Needed: No   Isolation: No. Infection: Not Applicable.     Vital Signs:   Vitals:    12/19/20 2011   BP: (!) 160/104   Pulse: 100   Resp: 20   Temp: 98.3  F (36.8  C)   TempSrc: Oral   SpO2: 98%       Cardiac Rhythm:  ,      Pain level:    Patient confused: No. Patient Falls Risk: No.   Elimination Status: has not voided since arrival   Patient Report - Initial Complaint: cat bite . Focused Assessment: patient presents to ED d/t cat bite to L index finger.   Increased swelling. Bite marked.      Tests Performed: BMP, CBC, xray . Abnormal Results:   Fingers XR, 2-3 views, left   Final Result   IMPRESSION: Normal joint spaces and alignment. No fracture. No evidence of osteomyelitis. Soft tissue swelling index finger.           Labs Ordered and Resulted from Time of ED Arrival Up to the Time of Departure from the ED   CBC WITH PLATELETS DIFFERENTIAL - Abnormal; Notable for the following components:       Result Value    WBC 11.2 (*)     All other components within normal limits   BASIC METABOLIC PANEL - Abnormal; Notable for the following components:    Glucose 155 (*)     All other components within normal limits   INFLUENZA A/B & SARS-COV2 PCR MULTIPLEX   HCG QUALITATIVE   PERIPHERAL IV CATHETER     .   Treatments provided:   Unasyn  Family Comments:  aware of admission  OBS brochure/video discussed/provided to patient:  Yes  ED Medications:   Medications   ampicillin-sulbactam (UNASYN) 3 g vial to attach to  mL bag (3 g Intravenous New Bag  12/19/20 2057)   HYDROmorphone (PF) (DILAUDID) injection 0.5 mg (has no administration in time range)     Drips infusing:  Yes  For the majority of the shift, the patient's behavior Green. Interventions performed were NA .    Sepsis treatment initiated: No     Patient tested for COVID 19 prior to admission: YES    ED Nurse Name/Phone Number: Hazel George RN,   9:33 PM

## 2020-12-20 NOTE — PHARMACY-ADMISSION MEDICATION HISTORY
Admission medication history interview status for this patient is complete. See [facility] admission navigator for allergy information, prior to admission medications and immunization status.     Medication history interview done via telephone during Covid-19 pandemic, indicate source(s): Patient  Medication history resources (including written lists, pill bottles, clinic record):None  Pharmacy: Discharge Pharmacy    Patient does not take any prescription/OTC medications at home.    Prior to Admission medications    Not on File

## 2020-12-20 NOTE — DISCHARGE INSTRUCTIONS
Follow up with orthopedic surgery, within 5 days for hospital follow- up.       Discharge Instructions for Cellulitis   You have been diagnosed with cellulitis. This is an infection in the deepest layer of the skin and tissue beneath the skin. In some cases, the infection also affects the muscle. Cellulitis is caused by bacteria. The bacteria can enter the body through broken skin. This can happen with a cut, scratch, animal bite, or an insect bite that has been scratched. You may have been treated in the hospital with antibiotics and fluids. You will likely be given a prescription for antibiotics to take at home. This sheet will help you take care of yourself at home.  Home care  When you are home:    Take the prescribed antibiotic medicine you are given as directed until it is gone. Take it even if you feel better. It treats the infection and stops it from returning. Not taking all the medicine can make future infections hard to treat.    Keep the infected area clean.    When possible, raise the infected area above the level of your heart. This helps keep swelling down.    Talk with your healthcare provider if you are in pain. Ask what kind of over-the-counter medicine you can take for pain.    Apply clean bandages as advised.    Take your temperature once a day for a week.    Wash your hands often to prevent spreading the infection.  In the future, wash your hands before and after you touch cuts, scratches, or bandages. This will help prevent infection.   When to call your healthcare provider  Call your healthcare provider right away if you have any of the following:    Trouble or pain when moving the joints above or below the infected area    Discharge or pus draining from the area    Fever of 100.4 F (38 C) or higher, or as directed by your healthcare provider    Pain that gets worse in or around the infected     Redness that gets worse in or around the infected area, particularly if the area of redness expands  to a wider area    Shaking chills    Swelling of the infected area    Vomiting  Sharan last reviewed this educational content on 11/1/2019 2000-2020 [employer]. [address] [place] [address], [place] [address]. All rights reserved. This information is not intended as a substitute for professional medical care. Always follow your healthcare professional's instructions.

## 2020-12-20 NOTE — H&P
History and Physical     Lulú Razo MRN# [id number]   YOB: 1965 Age: 55 year old      Date of Admission:  12/19/2020    Primary care provider: Ashley Mckay          Assessment and Plan:   Lulú Razo is a 55 year old female without prior medical history who presents with left second finger infection due to cat bite.    Patient was discussed with Dr. Kirkland, who was provider in ED. Chart review of ED work up was reviewed as well as chart review of Care Everywhere, previous visits and admissions.     #Concern for left second finger tenosynovitis  Vaccinated cat bite to left second finger on 12/19 at 1500.  Immediately following bite the finger became swollen, red and warm.  She is not able to bend it.  She is afebrile in the ED with slightly elevated WBC of 11.2.  X-ray of the finger does not show any fracture.  She was started on IV Unasyn and orthopedics was called who may need to do a washout tomorrow.  -Continue IV Unasyn  -Gentle IV fluids at 75 ml/hr  -Ice and elevate hand  -Alternate Tylenol with ibuprofen  -Have oxycodone available as needed  -Orthopedics consult  -Had Tetanus vaccine in 8/2020  -NPO after midnight in case procedure is needed      Social: No concerns  Code: Discussed with patient  and they have chosen full code  VTE prophylaxis: PCDs  Disposition: Observation                    Chief Complaint:   Left 2nd finger cat bite         History of Present Illness:   Lulú Razo is a 55 year old female who presents to the ER after her cat bit her at approximately 3 PM on 12/19.  She reports that she was putting the cat to sleep with a  in her house when it reacted to the sedative and bit her finger.  Shortly after the bite the finger began to swell and she is not able to bend it.  There is also some spreading erythema and warmth.  She denies any systemic symptoms including fever, chills, nausea and vomiting.  She has not been ill recently.  She  does not take any medicines regularly but does not go to the doctor regularly either.  She does not drink alcohol but smokes about 10 cigarettes/day.             Past Medical History:   Denies prior medical history            Past Surgical History:   Hx of prior sinus surgery          Social History:     Social History     Socioeconomic History     Marital status:      Spouse name: Not on file     Number of children: Not on file     Years of education: Not on file     Highest education level: Not on file   Occupational History     Not on file   Social Needs     Financial resource strain: Not on file     Food insecurity     Worry: Not on file     Inability: Not on file     Transportation needs     Medical: Not on file     Non-medical: Not on file   Tobacco Use     Smoking status: Current Every Day Smoker     Smokeless tobacco: Never Used   Substance and Sexual Activity     Alcohol use: Yes     Drug use: Never     Sexual activity: Yes     Partners: Male   Lifestyle     Physical activity     Days per week: Not on file     Minutes per session: Not on file     Stress: Not on file   Relationships     Social connections     Talks on phone: Not on file     Gets together: Not on file     Attends Quaker service: Not on file     Active member of club or organization: Not on file     Attends meetings of clubs or organizations: Not on file     Relationship status: Not on file     Intimate partner violence     Fear of current or ex partner: Not on file     Emotionally abused: Not on file     Physically abused: Not on file     Forced sexual activity: Not on file   Other Topics Concern     Not on file   Social History Narrative     Not on file               Family History:   Family history reviewed and is non contributory         Allergies:      Allergies   Allergen Reactions     Quinolones Hives               Medications:     Prior to Admission medications    Not on File              Review of Systems:   A Comprehensive  greater than 10 system review of systems was carried out.  Pertinent positives and negatives are noted above.  Otherwise negative for contributory information.            Physical Exam:   Blood pressure (!) 160/104, pulse 100, temperature 98.3  F (36.8  C), temperature source Oral, resp. rate 20, SpO2 98 %.  Exam:  GENERAL:  Comfortable.  PSYCH: pleasant, oriented, No acute distress.  HEENT:  PERRLA. Normal conjunctiva, normal hearing, nasal mucosa and Oropharynx are normal.  NECK:  Supple, no neck vein distention, adenopathy or bruits, normal thyroid.  HEART:  Normal S1, S2 with no murmur, no pericardial rub, gallops or S3 or S4.  LUNGS:  Clear to auscultation, normal Respiratory effort. No wheezing, rales or ronchi.  ABDOMEN:  Soft, no hepatosplenomegaly, normal bowel sounds. Non-tender, non distended.   EXTREMITIES: Left second finger is severely swollen and she is unable to bend it with erythema of the finger spreading into the hand.  There is no draining pus from the cat bite site.  SKIN:  Dry to touch, No rash, wound or ulcerations.  NEUROLOGIC:  CN 2-12 grossly intact,  sensation is intact with no focal deficits.               Data:     Recent Labs   Lab 12/19/20 2051   WBC 11.2*   HGB 13.7   HCT 41.3   MCV 95        Recent Labs   Lab 12/19/20 2051      POTASSIUM 3.6   CHLORIDE 108   CO2 28   ANIONGAP 4   *   BUN 11   CR 0.62   GFRESTIMATED >90   GFRESTBLACK >90   EDISON 8.6         Recent Results (from the past 24 hour(s))   Fingers XR, 2-3 views, left    Narrative    EXAM: XR FINGER LT G/E 2 VW  LOCATION: [facility]  DATE/TIME: 12/19/2020 9:10 PM    INDICATION: Cat bite index finger. Cellulitis.  COMPARISON: None.      Impression    IMPRESSION: Normal joint spaces and alignment. No fracture. No evidence of osteomyelitis. Soft tissue swelling index finger.             Kim Babb PA-C

## 2020-12-20 NOTE — CONSULTS
Dictation to follow    Admitted for IV antibiotics for cat bite  4 puncture wounds with blistering and purulence unroofed and debrided  Feeling better after   IV antibiotics over night, less swelling less erythema  Able to move finger much better at PIP and MCP joint    Responding to IV antibiotics, local wound care  Start soaks BID in hibiclens - adaptic and coban dressing  Discharge on Augmentin and will follow up in clinic this week    SLO

## 2020-12-20 NOTE — ED PROVIDER NOTES
History   Chief Complaint:  Cat Bite     HPI   Lulú Razo is a 55 year old female who presents with a cat bite. The patient reports that about 6 hours ago she was putting down her cat who is fully vaccinated. During the euthanasia, the care bit her left index finger twice. She reports increasing pain and swelling since the incident.  The pain has progressively worsened.  It is primary to the proximal portion of the finger but now radiates to the dorsum of the hand.  Pain is markedly aggravated by movement and touch.  She took 500 mg Amoxicillin but has not taken any pain medication. Denies aches, fever, or vomiting. Reports her tetanus is up to date.     Review of Systems   Constitutional: Negative for fever.   Gastrointestinal: Negative for vomiting.   Musculoskeletal: Negative for myalgias.   Skin: Positive for wound (bite).   All other systems reviewed and are negative.      Allergies:  Quinolones    Medications:  None    Past Medical History:    Patient denies any chronic conditions    Social History:     drove to ED     Physical Exam     Patient Vitals for the past 24 hrs:   BP Temp Temp src Pulse Resp SpO2   12/19/20 2011 (!) 160/104 98.3  F (36.8  C) Oral 100 20 98 %       Physical Exam                EYES:   Conjunctiva normal.  NECK:    Supple, no meningismus.   CV:     Regular rate and rhythm     No murmurs, rubs or gallops.       2+ radial pulses bilateral.  PULM:    Clear to auscultation bilateral.       No respiratory distress.      No wheezing, rales or stridor.  ABD:    Soft, non-tender, non-distended.       No rebound or guarding.  MSK:     Left index finger:       Diffuse soft tissue swelling of the digit      Finger held in a semiflexed position      Tenderness all along the flexor tendon      Moderate discomfort with passive extension      Punctate bite wounds noted to the radial and ulnar border overlying the proximal phalanx      Mild diffuse erythema that extends to the  dorsum of the second MCP joint  LYMPH:   No cervical lymphadenopathy.  NEURO:   Alert; good muscle tone     Speech is clear  SKIN:    Warm, dry   PSYCH:    Mood is good and affect is appropriate.      Emergency Department Course     Laboratory:  CBC: WBC 11.2 (H), HGB 13.7,   BMP: glucose 155 (H) o/w WNL (Creatinine 0.62)     Asymptomatic COVID-19 Virus (Coronavirus) by PCR: Pending   Influenza A/B Antigen: Pending    HCG Qualitative: negative     Emergency Department Course:    Reviewed:  I personally reviewed the nursing notes, vitals, past medical history.     Assessments:  : I obtained history from the patient and performed a physical exam.  : I updated the patient with findings and plan for observation admission.     Consults:   : I spoke with Dr. Louis of Ortho  : I spoke with WAYNE Babb of the Hospitalist service     Interventions:  : Unasyn 3 g in  mL IV     Disposition:  The patient was admitted to the hospital under the care of [facility] [place].     Impression & Plan     Medical Decision Makin-year-old female presented with cat bite to her left index finger with progressive swelling and pain.  Patient has features concerning for developing flexor tenosynovitis as noted above.  No indication for rabies prophylaxis.  Patient initiated on IV Unasyn.  X-rays negative for foreign body.  I spoke with the hand surgeon on call and we have agreed that patient will be sent to the observation unit to see if she improves with IV antibiotics alone.  If patient fails to improve, she will likely need operative intervention tomorrow morning.  Patient safe for transfer the floor.    Covid-19  Lulú Razo was evaluated during a global COVID-19 pandemic, which necessitated consideration that the patient might be at risk for infection with the SARS-CoV-2 virus that causes COVID-19.   Applicable protocols for evaluation were followed during the patient's care.   COVID-19 was  considered as part of the patient's evaluation. The plan for testing is:  a test was obtained during this visit.    Diagnosis:    ICD-10-CM    1. Flexor tenosynovitis of finger  M65.9 Asymptomatic Influenza A/B & SARS-CoV2 (COVID-19) Virus PCR Multiplex     CBC + differential     Basic metabolic panel (BMP)     CANCELED: HCG qualitative   2. Cat bite of finger, initial encounter  S61.259A     W55.01XA      Scribe Disclosure:  I, Felisha Deras, am serving as a scribe at 8:34 PM on 12/19/2020 to document services personally performed by Tl Kirkland MD based on my observations and the provider's statements to me.            Tl Kirkland MD  12/19/20 2049

## 2020-12-20 NOTE — ED TRIAGE NOTES
Pt states bitten on 2nd digit LUE earlier today by her cat. Pt states increased pain and swelling since time of bite. Cat was up to date on rabies vaccination per pt. ABCs intact GCS 15

## 2020-12-21 ENCOUNTER — OFFICE VISIT (OUTPATIENT)
Dept: FAMILY MEDICINE | Facility: CLINIC | Age: 55
End: 2020-12-21
Payer: COMMERCIAL

## 2020-12-21 VITALS
HEART RATE: 98 BPM | DIASTOLIC BLOOD PRESSURE: 87 MMHG | RESPIRATION RATE: 16 BRPM | TEMPERATURE: 98 F | WEIGHT: 203.8 LBS | SYSTOLIC BLOOD PRESSURE: 138 MMHG | OXYGEN SATURATION: 98 %

## 2020-12-21 DIAGNOSIS — H93.13 TINNITUS, BILATERAL: Primary | ICD-10-CM

## 2020-12-21 DIAGNOSIS — Z12.11 SCREEN FOR COLON CANCER: ICD-10-CM

## 2020-12-21 DIAGNOSIS — F41.1 GAD (GENERALIZED ANXIETY DISORDER): ICD-10-CM

## 2020-12-21 PROCEDURE — 99214 OFFICE O/P EST MOD 30 MIN: CPT | Performed by: FAMILY MEDICINE

## 2020-12-21 ASSESSMENT — PATIENT HEALTH QUESTIONNAIRE - PHQ9
SUM OF ALL RESPONSES TO PHQ QUESTIONS 1-9: 16
10. IF YOU CHECKED OFF ANY PROBLEMS, HOW DIFFICULT HAVE THESE PROBLEMS MADE IT FOR YOU TO DO YOUR WORK, TAKE CARE OF THINGS AT HOME, OR GET ALONG WITH OTHER PEOPLE: VERY DIFFICULT
SUM OF ALL RESPONSES TO PHQ QUESTIONS 1-9: 16

## 2020-12-21 ASSESSMENT — ANXIETY QUESTIONNAIRES
2. NOT BEING ABLE TO STOP OR CONTROL WORRYING: MORE THAN HALF THE DAYS
6. BECOMING EASILY ANNOYED OR IRRITABLE: NEARLY EVERY DAY
GAD7 TOTAL SCORE: 15
7. FEELING AFRAID AS IF SOMETHING AWFUL MIGHT HAPPEN: MORE THAN HALF THE DAYS
4. TROUBLE RELAXING: NEARLY EVERY DAY
3. WORRYING TOO MUCH ABOUT DIFFERENT THINGS: MORE THAN HALF THE DAYS
5. BEING SO RESTLESS THAT IT IS HARD TO SIT STILL: SEVERAL DAYS
7. FEELING AFRAID AS IF SOMETHING AWFUL MIGHT HAPPEN: MORE THAN HALF THE DAYS
GAD7 TOTAL SCORE: 15
1. FEELING NERVOUS, ANXIOUS, OR ON EDGE: MORE THAN HALF THE DAYS
GAD7 TOTAL SCORE: 15

## 2020-12-21 NOTE — PROGRESS NOTES
Subjective     Lulú Razo is a 55 year old female who presents to clinic today for the following health issues:    History of Present Illness       Mental Health Follow-up:  Patient presents to follow-up on Depression & Anxiety.Patient's depression since last visit has been:  No change  The patient is having other symptoms associated with depression.  Patient's anxiety since last visit has been:  No change  The patient is having other symptoms associated with anxiety.  Any significant life events: No and other  Patient is feeling anxious or having panic attacks.  Patient has no concerns about alcohol or drug use.     Social History  Tobacco Use    Smoking status: Current Every Day Smoker      Packs/day: 5.00      Types: Cigarettes    Smokeless tobacco: Never Used  Alcohol use: Yes    Comment: socially  Drug use: Never      Today's PHQ-9         PHQ-9 Total Score:     (P) 16   PHQ-9 Q9 Thoughts of better off dead/self-harm past 2 weeks :   (P) Not at all   Thoughts of suicide or self harm:      Self-harm Plan:        Self-harm Action:          Safety concerns for self or others:           She eats 0-1 servings of fruits and vegetables daily.She consumes 0 sweetened beverage(s) daily.She exercises with enough effort to increase her heart rate 10 to 19 minutes per day.  She exercises with enough effort to increase her heart rate 3 or less days per week.   She is taking medications regularly.      pt has been worrying about her health, and family, and other things, she recently had nasal injury, and she is worried about her look after his surgery.     Concern - Ringing in ears   Onset: X2 weeks   Description: Patient states that her feels like her ears and the ringing is so load. Patient was up until 3:00am because it was so bad.  Feels the ringing is slightly worse in the Lt.  Intensity: severe  Progression of Symptoms:  Worsening.  Previous history of similar problem: It would come and go but is staying all the  time.  Pt did have hx of occasional ringing in the ears, and some pain in the ear that goes away..  Precipitating factors:        Worsened by: None  Alleviating factors:        Improved by: Patient states that when she puts ear plugs in and listening to water fall sounds.   Therapies tried and outcome: none.  Pt said that her   Said that she has been increasing the volume of the TV.        Review of Systems   CONSTITUTIONAL: NEGATIVE for fever, chills, change in weight  RESP: NEGATIVE for significant cough or SOB  CV: NEGATIVE for chest pain, palpitations or peripheral edema  Skin: no rash  ENT: hard to breath from nose.  Psych: depressed mood and anxiety.  GI: normal.  Lymph : negative.  Neuro: negative.      Objective    /87 (BP Location: Right arm, Patient Position: Sitting, Cuff Size: Adult Large)   Pulse 98   Temp 98  F (36.7  C) (Oral)   Resp 16   Wt 92.4 kg (203 lb 12.8 oz)   SpO2 98%   Breastfeeding No   There is no height or weight on file to calculate BMI.  Physical Exam   GENERAL: healthy, alert and no distress  HENT: ear canals and TM's normal, nose and mouth without ulcers or lesions  NECK: no adenopathy, no asymmetry, masses, or scars and thyroid normal to palpation  RESP: lungs clear to auscultation - no rales, rhonchi or wheezes  NEURO: Normal strength and tone, mentation intact and speech normal  PSYCH: mentation appears normal, affect normal/bright            Assessment & Plan     Screen for colon cancer    - Fecal colorectal cancer screen FIT - Future (S+30); Future    CECILIA (generalized anxiety disorder)  Recurred, especially with the latest changes with COVID, and fall on the face, s/p surgical correction.  At this time, will restart on zzoloft once daily, and refer to therapy  - sertraline (ZOLOFT) 50 MG tablet; Take 1 tablet (50 mg) by mouth daily  - MENTAL HEALTH REFERRAL  - Adult; Outpatient Treatment; Individual/Couples/Family/Group Therapy/Health Psychology; MARTHA: Ashley  [facility] [phone]; We will contact you to schedule the appointment or please call with any questions    Tinnitus, bilateral  With normal hearing test, discussed that the nature is mostly benign, discussed using back ground noise, and use distraction.  Recheck with patient in 1 month.        Tobacco Cessation:   reports that she has been smoking cigarettes. She has been smoking about 5.00 packs per day for the past 0.00 years. She has never used smokeless tobacco.  Not discussed today.       Depression Screening Follow Up    PHQ 12/21/2020   PHQ-9 Total Score 16   Q9: Thoughts of better off dead/self-harm past 2 weeks Not at all                      Return in about 4 weeks (around 1/18/2021) for F2F.    Wanda Christopher MD  [facility]    Answers for HPI/ROS submitted by the patient on 12/21/2020   Chronic problems general questions HPI Form  If you checked off any problems, how difficult have these problems made it for you to do your work, take care of things at home, or get along with other people?: Very difficult  PHQ9 TOTAL SCORE: 16  CECILIA 7 TOTAL SCORE: 15

## 2020-12-21 NOTE — PROGRESS NOTES
HEARING FREQUENCY    Right Ear:      1000 Hz RESPONSE- on Level: 40 db (Conditioning sound)   1000 Hz: RESPONSE- on Level:   20 db    2000 Hz: RESPONSE- on Level:   20 db    4000 Hz: RESPONSE- on Level:   20 db     Left Ear:      4000 Hz: RESPONSE- on Level:   20 db    2000 Hz: RESPONSE- on Level:   20 db    1000 Hz: RESPONSE- on Level: tone not heard    500 Hz: RESPONSE- on Level: tone not heard    Right Ear:    500 Hz: RESPONSE- on Level: 25 db    Hearing Acuity: RESCREEN:  in 1 month. no significant hearing loss seen.    Hearing Assessment: normal

## 2020-12-22 ENCOUNTER — HOSPITAL ENCOUNTER (EMERGENCY)
Facility: CLINIC | Age: 55
Discharge: HOME | End: 2020-12-22
Attending: PHYSICIAN ASSISTANT | Admitting: PHYSICIAN ASSISTANT
Payer: COMMERCIAL

## 2020-12-22 ENCOUNTER — TELEPHONE (OUTPATIENT)
Dept: FAMILY MEDICINE | Facility: CLINIC | Age: 55
End: 2020-12-22

## 2020-12-22 ENCOUNTER — APPOINTMENT (OUTPATIENT)
Dept: GENERAL RADIOLOGY | Facility: CLINIC | Age: 55
End: 2020-12-22
Attending: PHYSICIAN ASSISTANT
Payer: COMMERCIAL

## 2020-12-22 VITALS
TEMPERATURE: 98.1 F | RESPIRATION RATE: 18 BRPM | DIASTOLIC BLOOD PRESSURE: 71 MMHG | OXYGEN SATURATION: 93 % | SYSTOLIC BLOOD PRESSURE: 152 MMHG | WEIGHT: 203 LBS | HEIGHT: 64 IN | HEART RATE: 87 BPM | BODY MASS INDEX: 34.66 KG/M2

## 2020-12-22 DIAGNOSIS — L02.512 ABSCESS OF LEFT INDEX FINGER: ICD-10-CM

## 2020-12-22 DIAGNOSIS — S61.258A CAT BITE OF INDEX FINGER, INITIAL ENCOUNTER: ICD-10-CM
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
## 2020-12-22 LAB
ANION GAP SERPL CALCULATED.3IONS-SCNC: 6 MMOL/L (ref 3–14)
BASOPHILS # BLD AUTO: 0 10E9/L (ref 0–0.2)
BASOPHILS NFR BLD AUTO: 0.3 %
BUN SERPL-MCNC: 10 MG/DL (ref 7–30)
CALCIUM SERPL-MCNC: 9.3 MG/DL (ref 8.5–10.1)
CHLORIDE SERPL-SCNC: 106 MMOL/L (ref 94–109)
CO2 SERPL-SCNC: 26 MMOL/L (ref 20–32)
CREAT SERPL-MCNC: 0.64 MG/DL (ref 0.52–1.04)
CRP SERPL-MCNC: 31.7 MG/L (ref 0–8)
DIFFERENTIAL METHOD BLD: NORMAL
EOSINOPHIL # BLD AUTO: 0 10E9/L (ref 0–0.7)
EOSINOPHIL NFR BLD AUTO: 0.2 %
ERYTHROCYTE [DISTWIDTH] IN BLOOD BY AUTOMATED COUNT: 11.6 % (ref 10–15)
ERYTHROCYTE [SEDIMENTATION RATE] IN BLOOD BY WESTERGREN METHOD: 35 MM/H (ref 0–30)
GFR SERPL CREATININE-BSD FRML MDRD: >90 ML/MIN/{1.73_M2}
GLUCOSE SERPL-MCNC: 118 MG/DL (ref 70–99)
HCT VFR BLD AUTO: 41.6 % (ref 35–47)
HGB BLD-MCNC: 14.2 G/DL (ref 11.7–15.7)
IMM GRANULOCYTES # BLD: 0 10E9/L (ref 0–0.4)
IMM GRANULOCYTES NFR BLD: 0.3 %
LACTATE BLD-SCNC: 0.9 MMOL/L (ref 0.7–2)
LYMPHOCYTES # BLD AUTO: 1.3 10E9/L (ref 0.8–5.3)
LYMPHOCYTES NFR BLD AUTO: 14.4 %
MCH RBC QN AUTO: 32 PG (ref 26.5–33)
MCHC RBC AUTO-ENTMCNC: 34.1 G/DL (ref 31.5–36.5)
MCV RBC AUTO: 94 FL (ref 78–100)
MONOCYTES # BLD AUTO: 0.4 10E9/L (ref 0–1.3)
MONOCYTES NFR BLD AUTO: 4.1 %
NEUTROPHILS # BLD AUTO: 7.4 10E9/L (ref 1.6–8.3)
NEUTROPHILS NFR BLD AUTO: 80.7 %
NRBC # BLD AUTO: 0 10*3/UL
NRBC BLD AUTO-RTO: 0 /100
PLATELET # BLD AUTO: 203 10E9/L (ref 150–450)
POTASSIUM SERPL-SCNC: 3.6 MMOL/L (ref 3.4–5.3)
RBC # BLD AUTO: 4.44 10E12/L (ref 3.8–5.2)
SODIUM SERPL-SCNC: 138 MMOL/L (ref 133–144)
WBC # BLD AUTO: 9.1 10E9/L (ref 4–11)

## 2020-12-22 PROCEDURE — 87070 CULTURE OTHR SPECIMN AEROBIC: CPT | Performed by: PHYSICIAN ASSISTANT

## 2020-12-22 PROCEDURE — 80048 BASIC METABOLIC PNL TOTAL CA: CPT | Performed by: PHYSICIAN ASSISTANT

## 2020-12-22 PROCEDURE — 86140 C-REACTIVE PROTEIN: CPT | Performed by: PHYSICIAN ASSISTANT

## 2020-12-22 PROCEDURE — 85025 COMPLETE CBC W/AUTO DIFF WBC: CPT | Performed by: PHYSICIAN ASSISTANT

## 2020-12-22 PROCEDURE — 99284 EMERGENCY DEPT VISIT MOD MDM: CPT | Mod: 25

## 2020-12-22 PROCEDURE — 83605 ASSAY OF LACTIC ACID: CPT | Performed by: PHYSICIAN ASSISTANT

## 2020-12-22 PROCEDURE — 85652 RBC SED RATE AUTOMATED: CPT | Performed by: PHYSICIAN ASSISTANT

## 2020-12-22 PROCEDURE — 250N000011 HC RX IP 250 OP 636: Performed by: PHYSICIAN ASSISTANT

## 2020-12-22 PROCEDURE — 96366 THER/PROPH/DIAG IV INF ADDON: CPT

## 2020-12-22 PROCEDURE — 96365 THER/PROPH/DIAG IV INF INIT: CPT

## 2020-12-22 PROCEDURE — 73140 X-RAY EXAM OF FINGER(S): CPT | Mod: LT

## 2020-12-22 RX ORDER — AMPICILLIN AND SULBACTAM 2; 1 G/1; G/1
3 INJECTION, POWDER, FOR SOLUTION INTRAMUSCULAR; INTRAVENOUS ONCE
Status: COMPLETED | OUTPATIENT
Start: 2020-12-22 | End: 2020-12-22

## 2020-12-22 RX ADMIN — AMPICILLIN SODIUM AND SULBACTAM SODIUM 3 G: 2; 1 INJECTION, POWDER, FOR SOLUTION INTRAMUSCULAR; INTRAVENOUS at 20:52

## 2020-12-22 ASSESSMENT — ANXIETY QUESTIONNAIRES: GAD7 TOTAL SCORE: 15

## 2020-12-22 ASSESSMENT — ENCOUNTER SYMPTOMS
WOUND: 1
CHILLS: 1
FEVER: 0

## 2020-12-22 ASSESSMENT — MIFFLIN-ST. JEOR: SCORE: 1510.8

## 2020-12-22 ASSESSMENT — PATIENT HEALTH QUESTIONNAIRE - PHQ9: SUM OF ALL RESPONSES TO PHQ QUESTIONS 1-9: 16

## 2020-12-22 NOTE — ED AVS SNAPSHOT
[facility] Emergency Dept  [address]  [facility] [address]  Phone: [phone]  Fax: [phone]                                    Lulú Razo   MRN: [id number]    Department: [facility] Emergency Dept   Date of Visit: 12/22/2020           After Visit Summary Signature Page    I have received my discharge instructions, and my questions have been answered. I have discussed any challenges I see with this plan with the nurse or doctor.    ..........................................................................................................................................  Patient/Patient Representative Signature      ..........................................................................................................................................  Patient Representative Print Name and Relationship to Patient    ..................................................               ................................................  Date                                   Time    ..........................................................................................................................................  Reviewed by Signature/Title    ...................................................              ..............................................  Date                                               Time          22EPIC Rev 08/18

## 2020-12-22 NOTE — TELEPHONE ENCOUNTER
"Received call from pt with concerns of nausea and \"cold\" flashes which started today    Pt is on ABX for cellulitis from a cat bite  Started on zoloft yesterday    Temp 96.7 Pt had had water to drink about 10 min prior  She feels like her whole body gets cold. This has be happening off and on for a couple of hours  No shaking or chills  Her finger is still red, but the swelling has decreased considerably    Advised pt on sx of infection and when to call  Take zoloft with food or at bedtime to help decrease nausea  Side effects usually improve with time  Pt to call the clinic any time 24 hours a day with any other concerns    Pt verbalized understanding and agrees to the plan    Thank you  Nadeen Dee RN on 12/22/2020 at 5:05 PM  "

## 2020-12-23 ENCOUNTER — TRANSFERRED RECORDS (OUTPATIENT)
Dept: HEALTH INFORMATION MANAGEMENT | Facility: CLINIC | Age: 55
End: 2020-12-23

## 2020-12-23 NOTE — DISCHARGE INSTRUCTIONS
Please call the hand surgery clinic (contact information below) to get an appointment for recheck tomorrow.  Please soak the area as instructed previously.  Continue Augmentin. Return immediately for any fevers, worsening symptoms, or any other concerns.

## 2020-12-23 NOTE — ED PROVIDER NOTES
"  History   Chief Complaint:  Wound Check       The history is provided by the patient.      Lulú Razo is a 55 year old female who presents for evaluation for wound check.  She states that she was seen here on 12/19 for a cat bite and was admitted for IV antibiotics, and a bedside I&D was performed.  She was discharged home on Augmentin, and has been taking this medication as prescribed. Today, she started having bouts of nausea and waves of feeling very cold.  She describes this as \"going into a freezer with a tank top on\".  These will last for several minutes, and then dissipate.  She denies any fevers.  She states that she has been soaking and caring for the wound as instructed, but notes that she only soaked her finger once a day.  She is noted some more swelling to the lateral aspect of her digit, but does note that the redness, swelling, and movement in her finger has improved.    Review of Systems   Constitutional: Positive for chills. Negative for fever.   Skin: Positive for wound.   All other systems reviewed and are negative.    Allergies:  Quinolones    Medications:  Sertraline    Past Medical History:    CECILIA    Past Surgical History:    Cholecystectomy  D & C  Abdominal surgery  Oophorectomy  Nose repair    Social History:  The patient was not accompanied to the ED.  Smoking Status: positive  Alcohol Use: occasional    Physical Exam     Patient Vitals for the past 24 hrs:   BP Temp Temp src Pulse Resp SpO2 Height Weight   12/22/20 1805 (!) 178/99 98.1  F (36.7  C) Oral 104 18 94 % 1.626 m (5' 4\") 92.1 kg (203 lb)       Physical Exam  General: Resting comfortably.  Alert and oriented.   Head:  The scalp, face, and head appear normal   Eyes:  Conjunctivae and sclerae are normal    CV:  Radial pulse intact to the left wrist.  Capillary refill is brisk in the distal left index finger.    Resp:  No tachypnea.  No respiratory distress.  MS:  The patient can flex and extend against resistance at the MCP, " DIP, and PIP joints of the left index finger.  Skin:  Healing cat bite wound noted to the left index finger as depicted below.  There is an area on the radial aspect of the digit consistent with forming abscess. There is surrounding erythema to this area, but improvement in erythema overall when comparing to marked area on skin. No drainage.   Neuro:  Sensation intact to distal left index finger.                  Emergency Department Course       Imaging:  Fingers XR, 2-3 views, left   Final Result   IMPRESSION: Soft tissue swelling in the base of the index finger. No soft tissue gas, foreign body or osteomyelitis evident. No evidence of retained tooth fragment.           Laboratory:  Results for orders placed or performed during the hospital encounter of 12/22/20   Fingers XR, 2-3 views, left     Status: None    Narrative    EXAM: XR FINGER LT G/E 2 VW  LOCATION: Health system  DATE/TIME: 12/22/2020 7:38 PM    INDICATION: Cat bite to index finger.  COMPARISON: None.      Impression    IMPRESSION: Soft tissue swelling in the base of the index finger. No soft tissue gas, foreign body or osteomyelitis evident. No evidence of retained tooth fragment.     CBC with platelets differential     Status: None   Result Value Ref Range    WBC 9.1 4.0 - 11.0 10e9/L    RBC Count 4.44 3.8 - 5.2 10e12/L    Hemoglobin 14.2 11.7 - 15.7 g/dL    Hematocrit 41.6 35.0 - 47.0 %    MCV 94 78 - 100 fl    MCH 32.0 26.5 - 33.0 pg    MCHC 34.1 31.5 - 36.5 g/dL    RDW 11.6 10.0 - 15.0 %    Platelet Count 203 150 - 450 10e9/L    Diff Method Automated Method     % Neutrophils 80.7 %    % Lymphocytes 14.4 %    % Monocytes 4.1 %    % Eosinophils 0.2 %    % Basophils 0.3 %    % Immature Granulocytes 0.3 %    Nucleated RBCs 0 0 /100    Absolute Neutrophil 7.4 1.6 - 8.3 10e9/L    Absolute Lymphocytes 1.3 0.8 - 5.3 10e9/L    Absolute Monocytes 0.4 0.0 - 1.3 10e9/L    Absolute Eosinophils 0.0 0.0 - 0.7 10e9/L    Absolute Basophils 0.0 0.0 -  0.2 10e9/L    Abs Immature Granulocytes 0.0 0 - 0.4 10e9/L    Absolute Nucleated RBC 0.0    Basic metabolic panel     Status: Abnormal   Result Value Ref Range    Sodium 138 133 - 144 mmol/L    Potassium 3.6 3.4 - 5.3 mmol/L    Chloride 106 94 - 109 mmol/L    Carbon Dioxide 26 20 - 32 mmol/L    Anion Gap 6 3 - 14 mmol/L    Glucose 118 (H) 70 - 99 mg/dL    Urea Nitrogen 10 7 - 30 mg/dL    Creatinine 0.64 0.52 - 1.04 mg/dL    GFR Estimate >90 >60 mL/min/[1.73_m2]    GFR Estimate If Black >90 >60 mL/min/[1.73_m2]    Calcium 9.3 8.5 - 10.1 mg/dL   Lactic acid whole blood     Status: None   Result Value Ref Range    Lactic Acid 0.9 0.7 - 2.0 mmol/L   Erythrocyte sedimentation rate auto     Status: Abnormal   Result Value Ref Range    Sed Rate 35 (H) 0 - 30 mm/h   CRP inflammation     Status: Abnormal   Result Value Ref Range    CRP Inflammation 31.7 (H) 0.0 - 8.0 mg/L     Procedures  none    Emergency Department Course:    Reviewed:  I reviewed nursing notes, vitals and past medical history.    Assessments:  1841 I initially evaluated the patient in ED room 30.   Patient was updated on plan and discharge plan/instructions.     Consults:   Dr. Temple--Hand surgery    Interventions:  Medications   ampicillin-sulbactam (UNASYN) 3 g vial to attach to  mL bag (3 g Intravenous New Bag 12/22/20 2052)       Disposition:  The patient was discharged to home.     Impression & Plan     Medical Decision Making:  Lulú Razo is a 55 year old female who presents for evaluation of a wound check.  Please refer to the HPI for full details. The patient was seen here on 12/19 for a cat bite to her finger and was admitted for IV antibiotics and a bedside incision and drainage was performed.  She was discharged home on Augmentin.  She is been taking this medication as prescribed.  She has been soaking as instructed.  She has noted severe nausea and chills and noted a swollen area to her finger, prompting her reevaluation.  On  exam, this is consistent with a forming abscess.  X-ray is negative for any acute abnormalities.  There is no fracture or signs of foreign body.  There is no soft tissue gas to suggest gangrene, etc.  Basic labs here are reassuring.  Inflammatory markers are mildly elevated.  Given the pustule, I did touch base with Dr. Temple of hand surgery who suggested that I perform a bedside drainage of the area and have her follow-up in clinic tomorrow for reevaluation.  He also recommended wound culture which is sent and pending and also another dose of IV Unasyn which was administered.  I had the patient soak the finger in warm water which allowed softening and drainage of the pustular area.  I did apply some pressure and cause much pustular content from the wound as I could.  The patient tolerated the procedure well.  There are no immediate complications.  A wound dressing was placed to the area.  Continued soaking was discussed and understood by the patient.  She will call the hand surgery clinic tomorrow to get an appointment for recheck tomorrow.  Red flag symptoms, and reasons return discussed and understood.  All questions were answered prior to discharge.  The patient understands and agrees with plan.    Diagnosis:    ICD-10-CM    1. Abscess of left index finger  L02.512 Wound Culture Aerobic Bacterial   2. Cat bite of index finger, initial encounter  S61.258A     W55.01XA        Discharge Medications:  Continue previously prescribed Augmentin    Scribe Disclosure:  I, Cecile Olmedo, am serving as a scribe at 6:32 PM on 12/22/2020 to document services personally performed by Lola Garcia PA-C based on my observations and the provider's statements to me.     Cecile Olmedo  12/22/2020   [facility] EMERGENCY DEPARTMENT     Lola Garcia PA-C  12/22/20 4299

## 2020-12-23 NOTE — ED TRIAGE NOTES
Pt presents for evaluation of a cat bite. Pt was bit on 12/19/20. Was admitted here overnight and received IV abx. Pt has been taking PO meds as prescribed. Today, started to feel nauseous and chills. Chills comes in waves. Noticed slight increase in redness around site. Pt was bit in left hand.

## 2020-12-23 NOTE — RESULT ENCOUNTER NOTE
[place] ED discharge antibiotic (if prescribed):  None;  Directed to continue Augmentin that was prescribed on 12/20/20 (7 day Rx)  Incision and Drainage performed in [place] ED [Yes / No] : drained in ED after soaking in hot water  No changes in treatment per ED lab result protocol.

## 2020-12-25 LAB
BACTERIA SPEC CULT: NO GROWTH
Lab: NORMAL
SPECIMEN SOURCE: NORMAL

## 2021-01-01 ENCOUNTER — EXTERNAL RECORD (OUTPATIENT)
Dept: OTHER | Age: 56
End: 2021-01-01

## 2021-01-04 ENCOUNTER — HEALTH MAINTENANCE LETTER (OUTPATIENT)
Age: 56
End: 2021-01-04

## 2021-01-20 ENCOUNTER — TELEPHONE (OUTPATIENT)
Dept: FAMILY MEDICINE | Facility: CLINIC | Age: 56
End: 2021-01-20

## 2021-01-20 NOTE — TELEPHONE ENCOUNTER
Needs of attention regarding:  -Cervical Cancer Screening    Health Maintenance Topics with due status: Overdue       Topic Date Due    PREVENTIVE CARE VISIT 1967    MAMMO SCREENING 1965    Pneumococcal Vaccine: Pediatrics (0 to 5 Years) and At-Risk Patients (6 to 64 Years) 02/24/1973    COLORECTAL CANCER SCREENING 02/24/1977    HIV SCREENING 02/24/1982    HEPATITIS C SCREENING 02/24/1985    PAP 02/24/1988    LIPID 02/24/2012    ZOSTER IMMUNIZATION 02/24/2017       Communication:  See MyChart message

## 2021-02-09 ENCOUNTER — OFFICE VISIT (OUTPATIENT)
Dept: FAMILY MEDICINE | Facility: CLINIC | Age: 56
End: 2021-02-09
Payer: COMMERCIAL

## 2021-02-09 VITALS
HEART RATE: 99 BPM | DIASTOLIC BLOOD PRESSURE: 88 MMHG | SYSTOLIC BLOOD PRESSURE: 166 MMHG | BODY MASS INDEX: 35.58 KG/M2 | HEIGHT: 64 IN | TEMPERATURE: 98 F | WEIGHT: 208.4 LBS | OXYGEN SATURATION: 94 %

## 2021-02-09 DIAGNOSIS — Z00.00 ROUTINE GENERAL MEDICAL EXAMINATION AT A HEALTH CARE FACILITY: Primary | ICD-10-CM

## 2021-02-09 DIAGNOSIS — F17.200 SMOKING: ICD-10-CM

## 2021-02-09 DIAGNOSIS — E66.09 CLASS 2 OBESITY DUE TO EXCESS CALORIES WITHOUT SERIOUS COMORBIDITY WITH BODY MASS INDEX (BMI) OF 35.0 TO 35.9 IN ADULT: ICD-10-CM

## 2021-02-09 DIAGNOSIS — E66.812 CLASS 2 OBESITY DUE TO EXCESS CALORIES WITHOUT SERIOUS COMORBIDITY WITH BODY MASS INDEX (BMI) OF 35.0 TO 35.9 IN ADULT: ICD-10-CM

## 2021-02-09 DIAGNOSIS — H93.13 TINNITUS, BILATERAL: ICD-10-CM

## 2021-02-09 PROBLEM — Z90.49 S/P CHOLECYSTECTOMY: Status: ACTIVE | Noted: 2021-02-09

## 2021-02-09 LAB — HBA1C MFR BLD: 5.9 % (ref 0–5.6)

## 2021-02-09 PROCEDURE — 99396 PREV VISIT EST AGE 40-64: CPT | Performed by: FAMILY MEDICINE

## 2021-02-09 PROCEDURE — 86803 HEPATITIS C AB TEST: CPT | Performed by: FAMILY MEDICINE

## 2021-02-09 PROCEDURE — 80061 LIPID PANEL: CPT | Performed by: FAMILY MEDICINE

## 2021-02-09 PROCEDURE — 36415 COLL VENOUS BLD VENIPUNCTURE: CPT | Performed by: FAMILY MEDICINE

## 2021-02-09 PROCEDURE — 87389 HIV-1 AG W/HIV-1&-2 AB AG IA: CPT | Performed by: FAMILY MEDICINE

## 2021-02-09 PROCEDURE — 82947 ASSAY GLUCOSE BLOOD QUANT: CPT | Performed by: FAMILY MEDICINE

## 2021-02-09 PROCEDURE — 83036 HEMOGLOBIN GLYCOSYLATED A1C: CPT | Performed by: FAMILY MEDICINE

## 2021-02-09 ASSESSMENT — ENCOUNTER SYMPTOMS
CHILLS: 1
BREAST MASS: 0

## 2021-02-09 ASSESSMENT — ANXIETY QUESTIONNAIRES
4. TROUBLE RELAXING: MORE THAN HALF THE DAYS
5. BEING SO RESTLESS THAT IT IS HARD TO SIT STILL: NOT AT ALL
GAD7 TOTAL SCORE: 8
GAD7 TOTAL SCORE: 8
2. NOT BEING ABLE TO STOP OR CONTROL WORRYING: SEVERAL DAYS
7. FEELING AFRAID AS IF SOMETHING AWFUL MIGHT HAPPEN: SEVERAL DAYS
GAD7 TOTAL SCORE: 8
7. FEELING AFRAID AS IF SOMETHING AWFUL MIGHT HAPPEN: SEVERAL DAYS
1. FEELING NERVOUS, ANXIOUS, OR ON EDGE: SEVERAL DAYS
6. BECOMING EASILY ANNOYED OR IRRITABLE: SEVERAL DAYS
3. WORRYING TOO MUCH ABOUT DIFFERENT THINGS: MORE THAN HALF THE DAYS

## 2021-02-09 ASSESSMENT — PATIENT HEALTH QUESTIONNAIRE - PHQ9
SUM OF ALL RESPONSES TO PHQ QUESTIONS 1-9: 5
10. IF YOU CHECKED OFF ANY PROBLEMS, HOW DIFFICULT HAVE THESE PROBLEMS MADE IT FOR YOU TO DO YOUR WORK, TAKE CARE OF THINGS AT HOME, OR GET ALONG WITH OTHER PEOPLE: SOMEWHAT DIFFICULT
SUM OF ALL RESPONSES TO PHQ QUESTIONS 1-9: 5

## 2021-02-09 ASSESSMENT — MIFFLIN-ST. JEOR: SCORE: 1535.3

## 2021-02-09 NOTE — PROGRESS NOTES
SUBJECTIVE:   CC: Lulú Razo is an 55 year old woman who presents for preventive health visit.       Patient has been advised of split billing requirements and indicates understanding: Yes  Healthy Habits:  Answers for HPI/ROS submitted by the patient on 2/9/2021   Annual Exam:  If you checked off any problems, how difficult have these problems made it for you to do your work, take care of things at home, or get along with other people?: Somewhat difficult  PHQ9 TOTAL SCORE: 5  CECILIA 7 TOTAL SCORE: 8  Frequency of exercise:: 1 day/week  Getting at least 3 servings of Calcium per day:: NO  Diet:: Regular (no restrictions)  Taking medications regularly:: Yes  Medication side effects:: Not applicable  Bi-annual eye exam:: NO  Dental care twice a year:: Yes  Sleep apnea or symptoms of sleep apnea:: Daytime drowsiness, Excessive snoring  chills: Yes  pelvic pain: No  vaginal bleeding: No  vaginal discharge: No  tenderness: No  breast mass: No  breast discharge: No  Additional concerns today:: Yes  Duration of exercise:: 15-30 minutes        Today we discussed smoking cessation and weight loss.    Today's PHQ-2 Score:   PHQ-2 ( 1999 Pf[employer]) 2/9/2021 12/21/2020   Q1: Little interest or pleasure in doing things 1 -   Q2: Feeling down, depressed or hopeless 0 -   PHQ-2 Score 1 -   Q1: Little interest or pleasure in doing things Several days -   Q2: Feeling down, depressed or hopeless Not at all -   PHQ-2 Score 1 Incomplete       Abuse: Current or Past(Physical, Sexual or Emotional)- No  Do you feel safe in your environment? Yes        Social History     Tobacco Use     Smoking status: Current Every Day Smoker     Packs/day: 5.00     Years: 0.00     Pack years: 0.00     Types: Cigarettes     Smokeless tobacco: Never Used     Tobacco comment: 2x day   Substance Use Topics     Alcohol use: Not Currently     Comment: socially     If you drink alcohol do you typically have >3 drinks per day or >7 drinks per week? No                       Reviewed orders with patient.  Reviewed health maintenance and updated orders accordingly - Yes  Lab work is in process  Labs reviewed in EPIC  BP Readings from Last 3 Encounters:   02/09/21 (!) 166/88   12/22/20 (!) 152/71   12/21/20 138/87    Wt Readings from Last 3 Encounters:   02/09/21 94.5 kg (208 lb 6.4 oz)   12/22/20 92.1 kg (203 lb)   12/21/20 92.4 kg (203 lb 12.8 oz)                    Breast CA Risk Screening:  No flowsheet data found.      Mammogram Screening: Recommended annual mammography  Pertinent mammograms are reviewed under the imaging tab.    Pertinent mammograms are reviewed under the imaging tab.  History of abnormal Pap smear: NO - age 30-65 PAP every 5 years with negative HPV co-testing recommended     Reviewed and updated as needed this visit by clinical staff  Tobacco  Allergies    Med Hx  Surg Hx  Fam Hx  Soc Hx      Anxiety Follow-Up    How are you doing with your anxiety since your last visit? Improved, started on zoloft, with some improvement, but she started hot flashes on and off.    Are you having other symptoms that might be associated with anxiety? No    Have you had a significant life event? Going through menopause.     Are you feeling depressed? No    Do you have any concerns with your use of alcohol or other drugs? No    Social History     Tobacco Use     Smoking status: Current Every Day Smoker     Packs/day: 5.00     Years: 0.00     Pack years: 0.00     Types: Cigarettes     Smokeless tobacco: Never Used     Tobacco comment: 2x day   Substance Use Topics     Alcohol use: Not Currently     Comment: socially     Drug use: Never     CECILIA-7 SCORE 12/21/2020 2/9/2021   Total Score 15 (severe anxiety) 8 (mild anxiety)   Total Score 15 8     PHQ 12/21/2020 2/9/2021   PHQ-9 Total Score 16 5   Q9: Thoughts of better off dead/self-harm past 2 weeks Not at all Not at all     The ringing in the ears has been tolerable.         Reviewed and updated as needed this visit by  "Provider                History reviewed. No pertinent past medical history.   Past Surgical History:   Procedure Laterality Date     ABDOMEN SURGERY  1984    Gallbladder     CHOLECYSTECTOMY      1985     GYN SURGERY      D&C, Oophrectomy- 2004     ORTHOPEDIC SURGERY      surgey for broken nose         pt is interested in smoking cessation today, patient has been smoking for 20 years, smokes about 5 to 10 cig  a day, her first cigarette is in the first 60 minutes of the day, she tried many  times to quit, methods tried are quit adonay.        ROS:  CONSTITUTIONAL: NEGATIVE for fever, chills, change in weight  INTEGUMENTARU/SKIN: NEGATIVE for worrisome rashes, moles or lesions  EYES: NEGATIVE for vision changes or irritation  ENT: NEGATIVE for ear, mouth and throat problems  RESP: NEGATIVE for significant cough or SOB  CV: NEGATIVE for chest pain, palpitations or peripheral edema  GI: NEGATIVE for nausea, abdominal pain, heartburn, or change in bowel habits   female: hot flashes.  MUSCULOSKELETAL: NEGATIVE for significant arthralgias or myalgia  NEURO: NEGATIVE for weakness, dizziness or paresthesias  PSYCHIATRIC: NEGATIVE for changes in mood or affect    OBJECTIVE:   BP (!) 166/88 (BP Location: Right arm, Patient Position: Sitting, Cuff Size: Adult Large)   Pulse 99   Temp 98  F (36.7  C) (Oral)   Ht 1.626 m (5' 4\")   Wt 94.5 kg (208 lb 6.4 oz)   SpO2 94%   BMI 35.77 kg/m    EXAM:  GENERAL: healthy, alert and no distress  EYES: Eyes grossly normal to inspection, PERRL and conjunctivae and sclerae normal  HENT: ear canals and TM's normal, nose and mouth without ulcers or lesions  NECK: no adenopathy, no asymmetry, masses, or scars and thyroid normal to palpation  RESP: lungs clear to auscultation - no rales, rhonchi or wheezes  CV: regular rate and rhythm, normal S1 S2, no S3 or S4, no murmur, click or rub, no peripheral edema and peripheral pulses strong  ABDOMEN: soft, nontender, no hepatosplenomegaly, no " "masses and bowel sounds normal  MS: no gross musculoskeletal defects noted, no edema  SKIN: no suspicious lesions or rashes  NEURO: Normal strength and tone, mentation intact and speech normal  PSYCH: mentation appears normal, affect normal/bright        ASSESSMENT/PLAN:   1. Routine general medical examination at a health care facility  Discussed diet and exercise today, check below labs.   - *MA Screening Digital Bilateral; Future  - Fecal colorectal cancer screen (FIT); Future  - Lipid panel reflex to direct LDL Non-fasting  - HIV Antigen Antibody Combo  - Hepatitis C Screen Reflex to HCV RNA Quant and Genotype  - Glucose  - Hemoglobin A1c    2. Tinnitus, bilateral  Stable, no changes    3. Class 2 obesity due to excess calories without serious comorbidity with body mass index (BMI) of 35.0 to 35.9 in adult  Today I counseled the patient about diet, regular exercise and weight loss planning.  Recheck in 1 month, at that time, will check BP too.    4. Smoking  Pt is highly motivated to quit, discussed options, prefer to try on her own.   Given quit program phone no to call.      Patient has been advised of split billing requirements and indicates understanding: No  COUNSELING:   Reviewed preventive health counseling, as reflected in patient instructions       Regular exercise       Healthy diet/nutrition    Estimated body mass index is 35.77 kg/m  as calculated from the following:    Height as of this encounter: 1.626 m (5' 4\").    Weight as of this encounter: 94.5 kg (208 lb 6.4 oz).    Weight management plan: Discussed healthy diet and exercise guidelines    She reports that she has been smoking cigarettes. She has been smoking about 5.00 packs per day for the past 0.00 years. She has never used smokeless tobacco.  Tobacco Cessation Action Plan:   Self help information given to patient      Counseling Resources:  ATP IV Guidelines  Pooled Cohorts Equation Calculator  Breast Cancer Risk Calculator  BRCA-Related " Cancer Risk Assessment: FHS-7 Tool  FRAX Risk Assessment  ICSI Preventive Guidelines  Dietary Guidelines for Americans, 2010  USDA's MyPlate  ASA Prophylaxis  Lung CA Screening    Wanda Christopher MD  [facility]

## 2021-02-10 LAB
CHOLEST SERPL-MCNC: 210 MG/DL
GLUCOSE SERPL-MCNC: 84 MG/DL (ref 70–99)
HCV AB SERPL QL IA: NONREACTIVE
HDLC SERPL-MCNC: 64 MG/DL
HIV 1+2 AB+HIV1 P24 AG SERPL QL IA: NONREACTIVE
LDLC SERPL CALC-MCNC: 125 MG/DL
NONHDLC SERPL-MCNC: 146 MG/DL
TRIGL SERPL-MCNC: 107 MG/DL

## 2021-02-10 ASSESSMENT — PATIENT HEALTH QUESTIONNAIRE - PHQ9: SUM OF ALL RESPONSES TO PHQ QUESTIONS 1-9: 5

## 2021-02-10 ASSESSMENT — ANXIETY QUESTIONNAIRES: GAD7 TOTAL SCORE: 8

## 2021-02-25 DIAGNOSIS — R20.2 PARESTHESIA OF SKIN: Primary | ICD-10-CM

## 2021-03-10 ENCOUNTER — MYC MEDICAL ADVICE (OUTPATIENT)
Dept: FAMILY MEDICINE | Facility: CLINIC | Age: 56
End: 2021-03-10

## 2021-03-12 ENCOUNTER — OFFICE VISIT (OUTPATIENT)
Dept: FAMILY MEDICINE | Facility: CLINIC | Age: 56
End: 2021-03-12
Payer: COMMERCIAL

## 2021-03-12 VITALS
OXYGEN SATURATION: 99 % | WEIGHT: 209 LBS | RESPIRATION RATE: 18 BRPM | BODY MASS INDEX: 35.87 KG/M2 | SYSTOLIC BLOOD PRESSURE: 140 MMHG | DIASTOLIC BLOOD PRESSURE: 88 MMHG | TEMPERATURE: 98.1 F | HEART RATE: 100 BPM

## 2021-03-12 DIAGNOSIS — N95.1 MENOPAUSAL SYNDROME (HOT FLASHES): Primary | ICD-10-CM

## 2021-03-12 LAB — TSH SERPL DL<=0.005 MIU/L-ACNC: 2.37 MU/L (ref 0.4–4)

## 2021-03-12 PROCEDURE — 84443 ASSAY THYROID STIM HORMONE: CPT | Performed by: NURSE PRACTITIONER

## 2021-03-12 PROCEDURE — 99213 OFFICE O/P EST LOW 20 MIN: CPT | Performed by: NURSE PRACTITIONER

## 2021-03-12 PROCEDURE — 83001 ASSAY OF GONADOTROPIN (FSH): CPT | Performed by: NURSE PRACTITIONER

## 2021-03-12 PROCEDURE — 83002 ASSAY OF GONADOTROPIN (LH): CPT | Performed by: NURSE PRACTITIONER

## 2021-03-12 PROCEDURE — 36415 COLL VENOUS BLD VENIPUNCTURE: CPT | Performed by: NURSE PRACTITIONER

## 2021-03-12 NOTE — PROGRESS NOTES
Assessment & Plan     Menopausal syndrome (hot flashes): will obtain below labs, discussed estroven OTC as well as paroxetine.  Will follow up in 2-4 weeks to discuss results and have pap completed.     - TSH with free T4 reflex  - Lutropin  - Follicle stimulating hormone      Will follow up in 2-4 weeks to discuss results and have pap completed.     Susan Haase, APRN CNP  [facility] EBONI Chino is a 56 year old who presents for the following health issues     HPI       Pt wants to discuss about having labs for hormone levels, pt stated  Has been having  hot flashes which is getting worst. Feeling warm and hot all day long.  Symptoms started a  Was bit by a cat in 12/2020, after that started getting hot and cold flashes more frequently.   LMP 3 years ago.  Denies hot flashes keeping her up at night, always sleeps with a fan on at night.    Denies hair loss, constipation/diarrhea. Having fatigue.       Review of Systems   CONSTITUTIONAL: NEGATIVE for fever, chills, change in weight  RESP: NEGATIVE for significant cough or SOB  CV: NEGATIVE for chest pain, palpitations or peripheral edema  ENDOCRINE: see HPI  PSYCHIATRIC: NEGATIVE for changes in mood or affect      Objective    BP (!) 140/88 (BP Location: Right arm, Patient Position: Sitting, Cuff Size: Adult Regular)   Pulse 100   Temp 98.1  F (36.7  C) (Oral)   Resp 18   Wt 94.8 kg (209 lb)   SpO2 99%   BMI 35.87 kg/m    Body mass index is 35.87 kg/m .  Physical Exam   GENERAL: healthy, alert and no distress  PSYCH: mentation appears normal, affect normal/bright

## 2021-03-13 LAB
FSH SERPL-ACNC: 33.9 IU/L
LH SERPL-ACNC: 19.4 IU/L

## 2021-03-14 NOTE — RESULT ENCOUNTER NOTE
Mart Chino,  Your lab results are as below:  1)  TSH (thyroid level) 2.37 which is normal (range 0.4-4)  2)  Lutropin shows that you are either mid cycle or postmenopausal.  3)  Follicle stimulating hormone is 33.9 which indicates you are postmenopausal.        We can discuss these results further when you return for your pap.    Sincerely,    Susan Haase, CNP

## 2021-03-22 ENCOUNTER — APPOINTMENT (OUTPATIENT)
Dept: REHABILITATION | Age: 56
End: 2021-03-22
Attending: PHYSICAL MEDICINE & REHABILITATION

## 2021-05-03 DIAGNOSIS — R20.2 PARESTHESIA OF UPPER LIMB: Primary | ICD-10-CM

## 2021-05-24 ENCOUNTER — TELEPHONE (OUTPATIENT)
Dept: FAMILY MEDICINE | Facility: CLINIC | Age: 56
End: 2021-05-24

## 2021-05-24 NOTE — LETTER
[facility]  [address] [id number] [phone]  June 2, 2021    Lulú Razo  [address] [address]    Dear Lulú,    I care about your health and have reviewed your health plan. I have reviewed your medical conditions, medication list, and lab results and am making recommendations based on this review, to better manage your health.    You are in particular need of attention regarding:  -Breast Cancer Screening  -Colon Cancer Screening  -Cervical Cancer Screening    I am recommending that you:  {recommendations:-schedule a MAMMOGRAM which is due. We have mammogram available at our clinic; please call [phone].   Please disregard this reminder if you have had this exam elsewhere within the last year.  It would be helpful for us to have a copy of your mammogram report in our file so that we can best coordinate your care.  -schedule a COLONOSCOPY to look for colon cancer (due every 10 years or 5 years in higher risk situations.)   Colon cancer is now the second leading cause of death in the [place] for both men and women and there are over 130,000 new cases and 50,000 deaths per year from colon cancer.  Colonoscopies can prevent 90-95% of these deaths.  Problem lesions can be removed before they ever become cancer.  This test is not only looking for cancer, but also getting rid of precancerious lesions.  If you do not wish to do a colonoscopy or cannot afford to do one, at this time, there is another option. It is called a FIT test or Fecal Immunochemical Occult Blood Test (take home stool sample kit).  It does not replace the colonoscopy for colorectal cancer screening, but it can detect hidden bleeding in the lower colon.  It does need to be repeated every year and if a positive result is obtained, you would be referred for a colonoscopy.  If you have completed either one of these tests at another facility,  please have the records sent to our clinic so that we can best coordinate your care.  -schedule a PAP SMEAR EXAM which is due.  Please disregard this reminder if you have had this exam elsewhere within the last year.  It would be helpful for us to have a copy of your recent pap smear report in our file so that we can best coordinate your care.    Here is a list of Health Maintenance topics that are due now or due soon:  Health Maintenance Due   Topic Date Due     ANNUAL REVIEW OF HM ORDERS  Never done     ADVANCE CARE PLANNING  Never done     MAMMO SCREENING  Never done     Pneumococcal Vaccine: Pediatrics (0 to 5 Years) and At-Risk Patients (6 to 64 Years) (1 of 2 - PPSV23) Never done     COLORECTAL CANCER SCREENING  Never done     COVID-19 Vaccine (1) Never done     PAP  Never done     ZOSTER IMMUNIZATION (1 of 2) Never done       Please call us at [phone] (or use [employer]) to address the above recommendations.     Thank you for trusting [facility] and we appreciate the opportunity to serve you.  We look forward to supporting your healthcare needs in the future.    Healthy Regards,    [facility] Care Team

## 2021-05-24 NOTE — TELEPHONE ENCOUNTER
Patient Quality Outreach Summary      Summary:    Patient is due/failing the following:   Colonoscopy, Breast Cancer Screening - Mammogram and Cervical Cancer Screening - PAP Needed    Type of outreach:    Phone, left message for patient/parent to call back.    Questions for provider review:    None                                                                                                                    Peggy Salguero MA       Chart routed to none.

## 2021-06-02 NOTE — TELEPHONE ENCOUNTER
Patient Quality Outreach 2nd Attempt      Summary:    Type of outreach:    Phone, left message for patient/parent to call back., Sent MyChart message. and Sent letter.    Next Steps:  Reach out within 90 days via Phone.    Max number of attempts reached: Yes. Will try again in 90 days if patient still on fail list.    Questions for provider review:    None                                                                                                                    Peggy Salguero MA       Chart routed to none.

## 2021-06-18 ENCOUNTER — APPOINTMENT (OUTPATIENT)
Dept: REHABILITATION | Age: 56
End: 2021-06-18
Attending: PHYSICAL MEDICINE & REHABILITATION

## 2021-07-02 ENCOUNTER — APPOINTMENT (OUTPATIENT)
Dept: REHABILITATION | Age: 56
End: 2021-07-02

## 2021-10-11 ENCOUNTER — HEALTH MAINTENANCE LETTER (OUTPATIENT)
Age: 56
End: 2021-10-11

## 2022-01-30 ENCOUNTER — HEALTH MAINTENANCE LETTER (OUTPATIENT)
Age: 57
End: 2022-01-30

## 2022-02-08 ENCOUNTER — TELEPHONE (OUTPATIENT)
Dept: FAMILY MEDICINE | Facility: CLINIC | Age: 57
End: 2022-02-08
Payer: COMMERCIAL

## 2022-02-08 NOTE — LETTER
"Lulú Razo  [address] [facility] [address] [address]           February 8, 2022    Dear Lulú Razo,     This is a reminder that it is time to make your appointment for your annual breast imaging. You may make an appointment for your breast imaging by calling our scheduling line   or by visiting [email or web address], clicking on the \"Visits\" tab and selecting \"Schedule An Appointment.    If you are experiencing breast symptoms or concerns such as a new lump or breast pain you should first contact your health care team before scheduling your breast imaging.  Your healthcare professional may want to see you prior to your visit.    As you know, early detection of cancer is very important. Currently the American College of Radiology and the Society of Breast Imaging recommend annual breast imaging beginning at the age of 40.    If you are a patient currently enrolled in the [place] [facility] Program or the [facility], you must be seen by your health care team for a clinical breast examination prior to your breast imaging.    If you have already made an appointment, please disregard this letter.    Thank you and we look forward to seeing you in the near future for your annual breast imaging.    Sincerely,    Your Care Team          "

## 2022-02-08 NOTE — TELEPHONE ENCOUNTER
Patient Quality Outreach    Patient is due for the following:   Breast Cancer Screening - Mammogram    NEXT STEPS:   Schedule a office visit for mammogram    Type of outreach:    Sent letter.    Questions for provider review:    None     Summer Mishra CMA           3/30/17: Hgb/Hct: 7.5/21.7, HbA1c - 5.6, chol - 99, trig - 96, HDL - 38, LDL - 42

## 2022-02-08 NOTE — LETTER
[facility]  [address] [facility] [address] [facility] [id number] [phone]  February 8, 2022    Lulú Razo  [address] [facility] [address] [facility] [address]    Dear Lulú,    I care about your health and have reviewed your health plan. I have reviewed your medical conditions, medication list, and lab results and am making recommendations based on this review, to better manage your health.    You are in particular need of attention regarding:  -Breast Cancer Screening  -Colon Cancer Screening  -Cervical Cancer Screening  -Wellness (Physical) Visit     I am recommending that you:  {recommendations:-schedule a WELLNESS (Physical) APPOINTMENT with me.   I will check fasting labs the same day - nothing to eat except water and meds for 8-10 hours prior.    Here is a list of Health Maintenance topics that are due now or due soon:  Health Maintenance Due   Topic Date Due     ANNUAL REVIEW OF HM ORDERS  Never done     ADVANCE CARE PLANNING  Never done     MAMMO SCREENING  Never done     COVID-19 Vaccine (1) Never done     Pneumococcal Vaccine: Pediatrics (0 to 5 Years) and At-Risk Patients (6 to 64 Years) (1 of 2 - PPSV23) Never done     COLORECTAL CANCER SCREENING  Never done     PAP  Never done     ZOSTER IMMUNIZATION (1 of 2) Never done     LUNG CANCER SCREENING  Never done     INFLUENZA VACCINE (1) 09/01/2021     PHQ-2  01/01/2022     PREVENTIVE CARE VISIT  02/09/2022       Please call us at [phone] (or use [employer]) to address the above recommendations.     Thank you for trusting [facility] and we appreciate the opportunity to serve you.  We look forward to supporting your healthcare needs in the future.    Healthy Regards,    Wanda Christopher MD

## 2022-03-27 ENCOUNTER — HEALTH MAINTENANCE LETTER (OUTPATIENT)
Age: 57
End: 2022-03-27

## 2022-06-22 ENCOUNTER — OFFICE VISIT (OUTPATIENT)
Dept: FAMILY MEDICINE | Facility: CLINIC | Age: 57
End: 2022-06-22
Payer: COMMERCIAL

## 2022-06-22 VITALS
OXYGEN SATURATION: 98 % | WEIGHT: 205 LBS | SYSTOLIC BLOOD PRESSURE: 160 MMHG | BODY MASS INDEX: 35 KG/M2 | HEIGHT: 64 IN | HEART RATE: 102 BPM | RESPIRATION RATE: 16 BRPM | DIASTOLIC BLOOD PRESSURE: 92 MMHG | TEMPERATURE: 98.1 F

## 2022-06-22 DIAGNOSIS — I88.9 CERVICAL LYMPHADENITIS: ICD-10-CM

## 2022-06-22 DIAGNOSIS — R00.0 TACHYCARDIA: ICD-10-CM

## 2022-06-22 DIAGNOSIS — I50.9 CONGESTIVE HEART FAILURE, UNSPECIFIED HF CHRONICITY, UNSPECIFIED HEART FAILURE TYPE (H): ICD-10-CM

## 2022-06-22 DIAGNOSIS — R91.8 LUNG FIELD ABNORMAL FINDING ON EXAMINATION: ICD-10-CM

## 2022-06-22 DIAGNOSIS — R00.0 SINUS TACHYCARDIA: ICD-10-CM

## 2022-06-22 DIAGNOSIS — E04.9 THYROID ENLARGEMENT: Primary | ICD-10-CM

## 2022-06-22 LAB
BASOPHILS # BLD AUTO: 0 10E3/UL (ref 0–0.2)
BASOPHILS NFR BLD AUTO: 0 %
EOSINOPHIL # BLD AUTO: 0.1 10E3/UL (ref 0–0.7)
EOSINOPHIL NFR BLD AUTO: 1 %
ERYTHROCYTE [DISTWIDTH] IN BLOOD BY AUTOMATED COUNT: 12.1 % (ref 10–15)
HBA1C MFR BLD: 6 % (ref 0–5.6)
HCT VFR BLD AUTO: 42.8 % (ref 35–47)
HGB BLD-MCNC: 14.8 G/DL (ref 11.7–15.7)
LYMPHOCYTES # BLD AUTO: 3.3 10E3/UL (ref 0.8–5.3)
LYMPHOCYTES NFR BLD AUTO: 35 %
MCH RBC QN AUTO: 32 PG (ref 26.5–33)
MCHC RBC AUTO-ENTMCNC: 34.6 G/DL (ref 31.5–36.5)
MCV RBC AUTO: 93 FL (ref 78–100)
MONOCYTES # BLD AUTO: 0.8 10E3/UL (ref 0–1.3)
MONOCYTES NFR BLD AUTO: 8 %
NEUTROPHILS # BLD AUTO: 5.2 10E3/UL (ref 1.6–8.3)
NEUTROPHILS NFR BLD AUTO: 56 %
PLATELET # BLD AUTO: 233 10E3/UL (ref 150–450)
RBC # BLD AUTO: 4.62 10E6/UL (ref 3.8–5.2)
WBC # BLD AUTO: 9.4 10E3/UL (ref 4–11)

## 2022-06-22 PROCEDURE — 99214 OFFICE O/P EST MOD 30 MIN: CPT | Performed by: FAMILY MEDICINE

## 2022-06-22 PROCEDURE — 80048 BASIC METABOLIC PNL TOTAL CA: CPT | Performed by: FAMILY MEDICINE

## 2022-06-22 PROCEDURE — 83880 ASSAY OF NATRIURETIC PEPTIDE: CPT | Performed by: FAMILY MEDICINE

## 2022-06-22 PROCEDURE — 84443 ASSAY THYROID STIM HORMONE: CPT | Performed by: FAMILY MEDICINE

## 2022-06-22 PROCEDURE — 36415 COLL VENOUS BLD VENIPUNCTURE: CPT | Performed by: FAMILY MEDICINE

## 2022-06-22 PROCEDURE — 83036 HEMOGLOBIN GLYCOSYLATED A1C: CPT | Performed by: FAMILY MEDICINE

## 2022-06-22 PROCEDURE — 93000 ELECTROCARDIOGRAM COMPLETE: CPT | Performed by: FAMILY MEDICINE

## 2022-06-22 PROCEDURE — 84439 ASSAY OF FREE THYROXINE: CPT | Performed by: FAMILY MEDICINE

## 2022-06-22 PROCEDURE — 85025 COMPLETE CBC W/AUTO DIFF WBC: CPT | Performed by: FAMILY MEDICINE

## 2022-06-22 RX ORDER — FUROSEMIDE 20 MG
20 TABLET ORAL DAILY
Qty: 14 TABLET | Refills: 0 | Status: SHIPPED | OUTPATIENT
Start: 2022-06-22 | End: 2022-07-15 | Stop reason: ALTCHOICE

## 2022-06-22 ASSESSMENT — PATIENT HEALTH QUESTIONNAIRE - PHQ9
SUM OF ALL RESPONSES TO PHQ QUESTIONS 1-9: 12
SUM OF ALL RESPONSES TO PHQ QUESTIONS 1-9: 12
10. IF YOU CHECKED OFF ANY PROBLEMS, HOW DIFFICULT HAVE THESE PROBLEMS MADE IT FOR YOU TO DO YOUR WORK, TAKE CARE OF THINGS AT HOME, OR GET ALONG WITH OTHER PEOPLE: VERY DIFFICULT

## 2022-06-22 ASSESSMENT — ANXIETY QUESTIONNAIRES
5. BEING SO RESTLESS THAT IT IS HARD TO SIT STILL: NOT AT ALL
4. TROUBLE RELAXING: NEARLY EVERY DAY
1. FEELING NERVOUS, ANXIOUS, OR ON EDGE: MORE THAN HALF THE DAYS
GAD7 TOTAL SCORE: 12
7. FEELING AFRAID AS IF SOMETHING AWFUL MIGHT HAPPEN: MORE THAN HALF THE DAYS
3. WORRYING TOO MUCH ABOUT DIFFERENT THINGS: MORE THAN HALF THE DAYS
GAD7 TOTAL SCORE: 12
2. NOT BEING ABLE TO STOP OR CONTROL WORRYING: SEVERAL DAYS
GAD7 TOTAL SCORE: 12
8. IF YOU CHECKED OFF ANY PROBLEMS, HOW DIFFICULT HAVE THESE MADE IT FOR YOU TO DO YOUR WORK, TAKE CARE OF THINGS AT HOME, OR GET ALONG WITH OTHER PEOPLE?: SOMEWHAT DIFFICULT
7. FEELING AFRAID AS IF SOMETHING AWFUL MIGHT HAPPEN: MORE THAN HALF THE DAYS
6. BECOMING EASILY ANNOYED OR IRRITABLE: MORE THAN HALF THE DAYS

## 2022-06-22 NOTE — PATIENT INSTRUCTIONS
I suspect your fluid like fullness in lower neck may be related to congestive heart failure. Your lung exam suggests this also. I will review your studies via [employer] when they are available. If you have any questions or concerns please let me know via MyChart, or call the clinic.

## 2022-06-22 NOTE — PROGRESS NOTES
"  Assessment & Plan   See after visit summary and result note from studies for helpful information and advice given to patient.  Patient started on empiric treatment for congestive heart failure while awaiting lab results.    Congestive heart failure, unspecified HF chronicity, unspecified heart failure type (H)  Tentative diagnosis made based on exam findings.  - furosemide (LASIX) 20 MG tablet  Dispense: 14 tablet; Refill: 0    Lung field abnormal finding on examination  Slight atelectasis noted on lung bases, more on left side.  - EKG 12-lead complete w/read - Clinics  - XR Chest 2 Views  - Hemoglobin A1c  - TSH with free T4 reflex  - Basic metabolic panel  - BNP-N terminal pro  - BNP-N terminal pro    Tachycardia  I suspect the tachycardia is related to volume overload.  - EKG 12-lead complete w/read - Clinics  - XR Chest 2 Views  - Hemoglobin A1c  - TSH with free T4 reflex  - Basic metabolic panel  - BNP-N terminal pro  - BNP-N terminal pro    Cervical lymphadenitis  Single lymph node noted in right mid anterior neck.  No related abnormality noted on CBC study.   - CBC with Platelets & Differential               Tobacco Cessation:   reports that she has been smoking cigarettes. She has been smoking about 0.50 packs per day for the past 0.00 years. She has never used smokeless tobacco.  Tobacco Cessation Action Plan: Not discussed at this visit.    BMI:   Estimated body mass index is 34.91 kg/m  as calculated from the following:    Height as of this encounter: 1.632 m (5' 4.25\").    Weight as of this encounter: 93 kg (205 lb).       Depression Screening Follow Up    PHQ 6/22/2022   PHQ-9 Total Score 12   Q9: Thoughts of better off dead/self-harm past 2 weeks Not at all         Follow Up Actions Taken  No acute mental health concerns mentioned at this visit.  No new action taken.         Return for Routine preventive, with me, with primary provider.    Isreal Kumari DO  [facility] " "MILA Chino is a 57 year old, presenting for the following health issues:  Mass      History of Present Illness       Reason for visit:  Soft lump base of neck fatigue weight  Symptom onset:  More than a month    She eats 0-1 servings of fruits and vegetables daily.She consumes 0 sweetened beverage(s) daily.She exercises with enough effort to increase her heart rate 9 or less minutes per day.  She exercises with enough effort to increase her heart rate 3 or less days per week.   She is taking medications regularly.    Today's PHQ-9         PHQ-9 Total Score: 12    PHQ-9 Q9 Thoughts of better off dead/self-harm past 2 weeks :   Not at all    How difficult have these problems made it for you to do your work, take care of things at home, or get along with other people: Very difficult  Today's CECILIA-7 Score: 12             Review of Systems   Patient is seen for chief concern of noting prominence of soft tissue over her anterior neck base region.    Patient has been fatigued since about December 2020.     She voices concern about her weight.  We reviewed her weight trends, feeling concerned about weight gain.  I reviewed the weight trends on computer with her, with this showing her having lost 4 pounds since March last year.  She states she still feels heavy.    On discussion of her breathing, patient denies having any recent shortness of breath.    I discussed her rapid heart rate this visit.  Review of records shows it has been variable, but today it is exceptionally high compared to her majority of previous readings.      Objective    BP (!) 160/92   Pulse 102   Temp 98.1  F (36.7  C) (Tympanic)   Resp 16   Ht 1.632 m (5' 4.25\")   Wt 93 kg (205 lb)   SpO2 98%   BMI 34.91 kg/m    Body mass index is 34.91 kg/m .   W    Physical Exam   Vital signs reviewed.  Patient is in no acute appearing distress.  Breathing appears nonlabored.  Patient is alert and oriented ×3.  Patient is very pleasant, " making good eye contact and responding with clear fluent speech.    ENT: Ear exam shows bilateral tympanic membranes to be clear without injection, nasal turbinates show no injection or edema, no pharyngeal injection or exudate.    Neck exam: Patient has soft, nontender soft tissue prominence measuring about 6 cm diameter over her anterior neck base.  No associated increased temperature or discoloration.  I palpated 1 cm nontender lymph node in her right mid cervical region.  No palpable thyroid nodules, though her thyroid did feel symmetrically enlarged.  Neck is supple.    Heart: Heart rate is regular without murmur.    Lungs: Lungs are clear to auscultation with good airflow in upper lobes, and fine crackles were noted in lung bases with fair airflow bilaterally.    Skin/extremities: Warm and dry, with no lower leg edema.                  .  ..

## 2022-06-22 NOTE — Clinical Note
Patient has been in [facility] 3 patients in the past, and has some new complex medical issues she is going to have to work through.  Please contact patient sometime in the next 2-3 business days to make sure all referrals are going smoothly. Thanks Isreal Kumari DO on 6/23/2022 at 5:18 PM

## 2022-06-23 LAB
ANION GAP SERPL CALCULATED.3IONS-SCNC: 8 MMOL/L (ref 3–14)
BUN SERPL-MCNC: 10 MG/DL (ref 7–30)
CALCIUM SERPL-MCNC: 9.3 MG/DL (ref 8.5–10.1)
CHLORIDE BLD-SCNC: 104 MMOL/L (ref 94–109)
CO2 SERPL-SCNC: 24 MMOL/L (ref 20–32)
CREAT SERPL-MCNC: 0.65 MG/DL (ref 0.52–1.04)
GFR SERPL CREATININE-BSD FRML MDRD: >90 ML/MIN/1.73M2
GLUCOSE BLD-MCNC: 104 MG/DL (ref 70–99)
NT-PROBNP SERPL-MCNC: 34 PG/ML (ref 0–125)
POTASSIUM BLD-SCNC: 3.9 MMOL/L (ref 3.4–5.3)
SODIUM SERPL-SCNC: 136 MMOL/L (ref 133–144)
T4 FREE SERPL-MCNC: 0.92 NG/DL (ref 0.76–1.46)
TSH SERPL DL<=0.005 MIU/L-ACNC: 4.85 MU/L (ref 0.4–4)

## 2022-06-24 ENCOUNTER — TELEPHONE (OUTPATIENT)
Dept: FAMILY MEDICINE | Facility: CLINIC | Age: 57
End: 2022-06-24

## 2022-06-24 DIAGNOSIS — I10 ESSENTIAL HYPERTENSION: Primary | ICD-10-CM

## 2022-06-24 RX ORDER — CHLORTHALIDONE 25 MG/1
25 TABLET ORAL DAILY
Qty: 30 TABLET | Refills: 1 | Status: SHIPPED | OUTPATIENT
Start: 2022-06-24 | End: 2022-07-15

## 2022-06-24 NOTE — TELEPHONE ENCOUNTER
I spoke with patient about her heart rate, and hypertension noted over the past couple years and most blood pressure screenings.  She was agreeable to starting treatment using a diuretic, and I recommended chlorthalidone.  She prefers not splitting the tablet which is required for the 12.5 mg daily dosage.  She will plan on following up with me in clinic in 3 to 4 weeks for repeat labs and exam.  I cautioned her that this medication can lower her potassium level.  I felt diuretic therapy was best given the crackles I heard possibly caused from fluid in her bilateral lungs on recent exam.  She has a cardiology appointment scheduled for this upcoming August.  She rechecked her heart rate when I spoke to her by telephone, and it was not tachycardic at 80 bpm.  Isreal Kumari, DO on 6/24/2022 at 12:11 PM

## 2022-07-01 ENCOUNTER — HOSPITAL ENCOUNTER (OUTPATIENT)
Dept: ULTRASOUND IMAGING | Facility: CLINIC | Age: 57
Discharge: HOME | End: 2022-07-01
Attending: FAMILY MEDICINE
Payer: COMMERCIAL

## 2022-07-01 DIAGNOSIS — E04.9 THYROID ENLARGEMENT: ICD-10-CM

## 2022-07-01 PROCEDURE — 76536 US EXAM OF HEAD AND NECK: CPT

## 2022-07-15 ENCOUNTER — OFFICE VISIT (OUTPATIENT)
Dept: FAMILY MEDICINE | Facility: CLINIC | Age: 57
End: 2022-07-15
Payer: COMMERCIAL

## 2022-07-15 VITALS
HEIGHT: 64 IN | WEIGHT: 198 LBS | OXYGEN SATURATION: 97 % | RESPIRATION RATE: 16 BRPM | BODY MASS INDEX: 33.8 KG/M2 | SYSTOLIC BLOOD PRESSURE: 139 MMHG | DIASTOLIC BLOOD PRESSURE: 85 MMHG | HEART RATE: 98 BPM

## 2022-07-15 DIAGNOSIS — Z12.4 CERVICAL CANCER SCREENING: ICD-10-CM

## 2022-07-15 DIAGNOSIS — I10 ESSENTIAL HYPERTENSION: ICD-10-CM

## 2022-07-15 DIAGNOSIS — R73.03 PREDIABETES: ICD-10-CM

## 2022-07-15 DIAGNOSIS — Z12.31 VISIT FOR SCREENING MAMMOGRAM: ICD-10-CM

## 2022-07-15 DIAGNOSIS — R79.89 ELEVATED TSH: ICD-10-CM

## 2022-07-15 DIAGNOSIS — R74.01 ELEVATED ALT MEASUREMENT: ICD-10-CM

## 2022-07-15 DIAGNOSIS — Z12.11 SCREEN FOR COLON CANCER: ICD-10-CM

## 2022-07-15 DIAGNOSIS — Z13.220 SCREENING FOR HYPERLIPIDEMIA: ICD-10-CM

## 2022-07-15 LAB
ALBUMIN SERPL-MCNC: 3.7 G/DL (ref 3.4–5)
ALP SERPL-CCNC: 90 U/L (ref 40–150)
ALT SERPL W P-5'-P-CCNC: 59 U/L (ref 0–50)
ANION GAP SERPL CALCULATED.3IONS-SCNC: 8 MMOL/L (ref 3–14)
AST SERPL W P-5'-P-CCNC: 32 U/L (ref 0–45)
BILIRUB SERPL-MCNC: 0.3 MG/DL (ref 0.2–1.3)
BUN SERPL-MCNC: 10 MG/DL (ref 7–30)
CALCIUM SERPL-MCNC: 9.4 MG/DL (ref 8.5–10.1)
CHLORIDE BLD-SCNC: 100 MMOL/L (ref 94–109)
CHOLEST SERPL-MCNC: 210 MG/DL
CO2 SERPL-SCNC: 28 MMOL/L (ref 20–32)
CREAT SERPL-MCNC: 0.63 MG/DL (ref 0.52–1.04)
FASTING STATUS PATIENT QL REPORTED: NO
GFR SERPL CREATININE-BSD FRML MDRD: >90 ML/MIN/1.73M2
GLUCOSE BLD-MCNC: 163 MG/DL (ref 70–99)
HDLC SERPL-MCNC: 58 MG/DL
LDLC SERPL CALC-MCNC: 127 MG/DL
NONHDLC SERPL-MCNC: 152 MG/DL
POTASSIUM BLD-SCNC: 3.6 MMOL/L (ref 3.4–5.3)
PROT SERPL-MCNC: 7.8 G/DL (ref 6.8–8.8)
SODIUM SERPL-SCNC: 136 MMOL/L (ref 133–144)
TRIGL SERPL-MCNC: 126 MG/DL

## 2022-07-15 PROCEDURE — 36415 COLL VENOUS BLD VENIPUNCTURE: CPT | Performed by: FAMILY MEDICINE

## 2022-07-15 PROCEDURE — 80053 COMPREHEN METABOLIC PANEL: CPT | Performed by: FAMILY MEDICINE

## 2022-07-15 PROCEDURE — 99214 OFFICE O/P EST MOD 30 MIN: CPT | Performed by: FAMILY MEDICINE

## 2022-07-15 PROCEDURE — 84443 ASSAY THYROID STIM HORMONE: CPT | Performed by: FAMILY MEDICINE

## 2022-07-15 PROCEDURE — 80061 LIPID PANEL: CPT | Performed by: FAMILY MEDICINE

## 2022-07-15 RX ORDER — CHLORTHALIDONE 25 MG/1
25 TABLET ORAL DAILY
Qty: 90 TABLET | Refills: 3 | Status: SHIPPED | OUTPATIENT
Start: 2022-07-15 | End: 2023-07-27

## 2022-07-15 ASSESSMENT — ANXIETY QUESTIONNAIRES
4. TROUBLE RELAXING: MORE THAN HALF THE DAYS
GAD7 TOTAL SCORE: 7
3. WORRYING TOO MUCH ABOUT DIFFERENT THINGS: SEVERAL DAYS
7. FEELING AFRAID AS IF SOMETHING AWFUL MIGHT HAPPEN: SEVERAL DAYS
5. BEING SO RESTLESS THAT IT IS HARD TO SIT STILL: SEVERAL DAYS
6. BECOMING EASILY ANNOYED OR IRRITABLE: NOT AT ALL
8. IF YOU CHECKED OFF ANY PROBLEMS, HOW DIFFICULT HAVE THESE MADE IT FOR YOU TO DO YOUR WORK, TAKE CARE OF THINGS AT HOME, OR GET ALONG WITH OTHER PEOPLE?: SOMEWHAT DIFFICULT
GAD7 TOTAL SCORE: 7
7. FEELING AFRAID AS IF SOMETHING AWFUL MIGHT HAPPEN: SEVERAL DAYS
2. NOT BEING ABLE TO STOP OR CONTROL WORRYING: SEVERAL DAYS
1. FEELING NERVOUS, ANXIOUS, OR ON EDGE: SEVERAL DAYS
GAD7 TOTAL SCORE: 7

## 2022-07-15 ASSESSMENT — PATIENT HEALTH QUESTIONNAIRE - PHQ9
SUM OF ALL RESPONSES TO PHQ QUESTIONS 1-9: 6
10. IF YOU CHECKED OFF ANY PROBLEMS, HOW DIFFICULT HAVE THESE PROBLEMS MADE IT FOR YOU TO DO YOUR WORK, TAKE CARE OF THINGS AT HOME, OR GET ALONG WITH OTHER PEOPLE: SOMEWHAT DIFFICULT
SUM OF ALL RESPONSES TO PHQ QUESTIONS 1-9: 6

## 2022-07-15 NOTE — PROGRESS NOTES
"  Assessment & Plan     Essential hypertension    - Comprehensive metabolic panel  - chlorthalidone (HYGROTON) 25 MG tablet; Take 1 tablet (25 mg) by mouth daily    Prediabetes    - AMB Adult Diabetes Educator Referral; Future  - Comprehensive metabolic panel  - Hemoglobin A1c; Future    Visit for screening mammogram    - MA SCREENING DIGITAL BILAT - Future  (s+30); Future    Screen for colon cancer    - Fecal colorectal cancer screen FIT; Future  - Fecal colorectal cancer screen FIT    Cervical cancer screening      Screening for hyperlipidemia    - Lipid panel reflex to direct LDL Fasting; Future  - Lipid panel reflex to direct LDL Fasting    Elevated TSH    - TSH with free T4 reflex    Elevated ALT measurement    - Hepatic function panel; Future             BMI:   Estimated body mass index is 33.72 kg/m  as calculated from the following:    Height as of this encounter: 1.632 m (5' 4.25\").    Weight as of this encounter: 89.8 kg (198 lb).   Weight management plan: Discussed healthy diet and exercise guidelines        Return in about 3 months (around 10/15/2022) for Routine preventive.    Isreal Kumari DO  [facility] is a 57 year old, presenting for the following health issues:  Hypertension (Follow-up BP check and tachycardia)      History of Present Illness       Hypertension: She presents for follow up of hypertension.  She does not check blood pressure  regularly outside of the clinic. Outside blood pressures have been over 140/90. She follows a low salt diet.      Today's PHQ-9         PHQ-9 Total Score: 6    PHQ-9 Q9 Thoughts of better off dead/self-harm past 2 weeks :   Not at all    How difficult have these problems made it for you to do your work, take care of things at home, or get along with other people: Somewhat difficult  Today's CECILIA-7 Score: 7             Review of Systems   Patient is seen for follow-up evaluation of blood pressure, and evaluation " "previously for possible congestive heart failure.    Patient states she feels better with more energy while taking chlorthalidone.  She has lost some weight since starting treatment with this which we discussed may be from the diuretic effect of the medication.    Quit smoking a few weeks ago!    At time of exam, patient has no acute concerns.  She is fasting this visit.    With discussion of the crackles noted in lung bases again this visit, patient told me how she did have some significant lung infections when she was a child.  We discussed how the crackles may be chronic after this illness.        Objective    /85 (BP Location: Right arm, Patient Position: Chair, Cuff Size: Adult Large)   Pulse 98   Resp 16   Ht 1.632 m (5' 4.25\")   Wt 89.8 kg (198 lb)   SpO2 97%   Breastfeeding No   BMI 33.72 kg/m    Body mass index is 33.72 kg/m .  Physical Exam   Vital signs reviewed.  Patient is in no acute appearing distress.  Breathing appears nonlabored.  Patient is alert and oriented ×3.  Patient is very pleasant, making good eye contact and responding with clear fluent speech.    Heart:  Heart rate is normal, regular rhythm without murmur.    Lungs:  Lung sounds are clear to auscultation with good airflow except for fine crackles noted in right lower lobe and left lower lobe.  This is unchanged from previous visit.    Skin/extremities: Warm and dry, with no lower leg edema.                    .  ..  "

## 2022-07-15 NOTE — PATIENT INSTRUCTIONS
I will review your studies via [employer] when they are available. If you have any questions or concerns please let me know via [employer], or call the clinic.

## 2022-07-16 LAB — TSH SERPL DL<=0.005 MIU/L-ACNC: 3.7 MU/L (ref 0.4–4)

## 2022-07-18 ENCOUNTER — TELEPHONE (OUTPATIENT)
Dept: FAMILY MEDICINE | Facility: CLINIC | Age: 57
End: 2022-07-18

## 2022-07-18 NOTE — TELEPHONE ENCOUNTER
Diabetes Education Scheduling Outreach #1:    Call to patient to schedule. Left message with phone number to call to schedule.    Also sent [employer] message for second attempt. Requested patient to call to schedule.    Karen Ramirez OnCall  Diabetes and Nutrition Scheduling

## 2022-08-01 ENCOUNTER — OFFICE VISIT (OUTPATIENT)
Dept: CARDIOLOGY | Facility: CLINIC | Age: 57
End: 2022-08-01
Attending: FAMILY MEDICINE
Payer: COMMERCIAL

## 2022-08-01 VITALS
HEIGHT: 64 IN | BODY MASS INDEX: 33.46 KG/M2 | OXYGEN SATURATION: 98 % | WEIGHT: 196 LBS | SYSTOLIC BLOOD PRESSURE: 146 MMHG | DIASTOLIC BLOOD PRESSURE: 88 MMHG | HEART RATE: 100 BPM

## 2022-08-01 DIAGNOSIS — R00.0 SINUS TACHYCARDIA: ICD-10-CM

## 2022-08-01 PROCEDURE — 99203 OFFICE O/P NEW LOW 30 MIN: CPT | Performed by: INTERNAL MEDICINE

## 2022-08-01 NOTE — PROGRESS NOTES
HPI and Plan:   Today I had the pleasure of seeing Lulú Razo at [facility]. She is a pleasant 57 year old patient with a past medical history of thyroid nodules, hypertension who presents to the clinic in consultation for sinus tachycardia after being referred by her primary care physician.  The patient has noted sinus tachycardia for quite some time and notes dating back to 2020 show heart rates in low 100s and high 90s.  Her thyroid function is normal.  The patient tells me that she is perimenopausal and is mostly lettuce in her life.  She was having generalized fatigue and throbbing sensation in her neck.  Her symptoms improved after she was started on chlorthalidone sometime ago for hypertension.  Since then she has noticed more energy, has lost some weight and throbbing in the neck has resolved.  She has also cut down on smoking and only smokes 2 cigarettes a day now.  She is planning to eat healthy and start to exercise.    She denies chest pain, shortness of breath, orthopnea, PND, dizziness, presyncope or syncope.  Her fasting lipid profile shows LDL cholesterol of 129 mg/dL and total cholesterol of 210 mg/dL.  Her ALT is 59 and AST is 32.    Assessment and plan  1.  Sinus tachycardia  2.  Essential hypertension  3.  Chronic smoker  4.  BMI 33 kg per metered square    She has felt significantly better since starting chlorthalidone which I will continue.  We also talked about lifestyle modifications such as eating healthy diet, exercising regularly and the need to quit smoking.  I do not have a clear-cut etiology for her sinus tachycardia but I believe it is most likely due to to the her being chronic smoker, perimenopause and deconditioning.  Her TSH and T4 are normal.  I do not believe this needs further aggressive work-up at this time besides getting her regular transthoracic echocardiogram which I will order today.  I will reach out to her with the results of the test.  I  will have her come back and see me on as-needed basis going forward.    Thank you for allowing me to participate in the care of Lulú Razo    This note was completed in part using Dragon voice recognition software. Although reviewed after completion, some word and grammatical errors may occur.    Surinder Mcmullen MD  Cardiology    No orders of the defined types were placed in this encounter.      No orders of the defined types were placed in this encounter.      There are no discontinued medications.    Encounter Diagnosis   Name Primary?     Sinus tachycardia        CURRENT MEDICATIONS:  Current Outpatient Medications   Medication Sig Dispense Refill     chlorthalidone (HYGROTON) 25 MG tablet Take 1 tablet (25 mg) by mouth daily 90 tablet 3       ALLERGIES     Allergies   Allergen Reactions     Quinolones Hives       PAST MEDICAL HISTORY:  History reviewed. No pertinent past medical history.    PAST SURGICAL HISTORY:  Past Surgical History:   Procedure Laterality Date     ABDOMEN SURGERY  1984    Gallbladder     CHOLECYSTECTOMY      1985     GYN SURGERY      D&C, Oophrectomy- 2004     ORTHOPEDIC SURGERY      surgey for broken nose       FAMILY HISTORY:  History reviewed. No pertinent family history.    SOCIAL HISTORY:  Social History     Socioeconomic History     Marital status:      Spouse name: None     Number of children: None     Years of education: None     Highest education level: None   Tobacco Use     Smoking status: Current Every Day Smoker     Packs/day: 0.50     Years: 0.00     Pack years: 0.00     Types: Cigarettes     Smokeless tobacco: Never Used     Tobacco comment: 2x day.. wants to quiet   Vaping Use     Vaping Use: Never used   Substance and Sexual Activity     Alcohol use: Not Currently     Drug use: Never     Sexual activity: Not Currently     Partners: Male       Review of Systems:  Skin:  not assessed       Eyes:  not assessed      ENT:  not assessed      Respiratory:  Positive for  "wheezing seasonally gets wheezing.   Cardiovascular:    palpitations;dizziness palpitations on and off - less since stopping drinking coffee. Dizziness after working a long time on the computer.  Gastroenterology: not assessed      Genitourinary:  not assessed      Musculoskeletal:  not assessed      Neurologic:  not assessed      Psychiatric:  not assessed      Heme/Lymph/Imm:  not assessed      Endocrine:  Negative   pre-diabetic, thyroid has nodule but no action needed now.    Physical Exam:  Vitals: BP (!) 146/88   Pulse 100   Ht 1.632 m (5' 4.25\")   Wt 88.9 kg (196 lb)   SpO2 98%   BMI 33.38 kg/m    Eyes: No icterus.  Pulmonary: Chest symmetric, lungs clear bilaterally and no crackles, wheezes or rales.  Cardiovascular: RRR with normal S1 and S2, no murmur, JVP normal.  Musculoskeletal: Edema of the lower extremities: None.  Neurologic: Oriented and appropriate without obvious focal deficits.   Psychiatric: Normal affect.     Recent Lab Results:  LIPID RESULTS:  Lab Results   Component Value Date    CHOL 210 (H) 07/15/2022    CHOL 210 (H) 02/09/2021    HDL 58 07/15/2022    HDL 64 02/09/2021     (H) 07/15/2022     (H) 02/09/2021    TRIG 126 07/15/2022    TRIG 107 02/09/2021       LIVER ENZYME RESULTS:  Lab Results   Component Value Date    AST 32 07/15/2022    ALT 59 (H) 07/15/2022       CBC RESULTS:  Lab Results   Component Value Date    WBC 9.4 06/22/2022    WBC 9.1 12/22/2020    RBC 4.62 06/22/2022    RBC 4.44 12/22/2020    HGB 14.8 06/22/2022    HGB 14.2 12/22/2020    HCT 42.8 06/22/2022    HCT 41.6 12/22/2020    MCV 93 06/22/2022    MCV 94 12/22/2020    MCH 32.0 06/22/2022    MCH 32.0 12/22/2020    MCHC 34.6 06/22/2022    MCHC 34.1 12/22/2020    RDW 12.1 06/22/2022    RDW 11.6 12/22/2020     06/22/2022     12/22/2020       BMP RESULTS:  Lab Results   Component Value Date     07/15/2022     12/22/2020    POTASSIUM 3.6 07/15/2022    POTASSIUM 3.6 12/22/2020    " CHLORIDE 100 07/15/2022    CHLORIDE 106 12/22/2020    CO2 28 07/15/2022    CO2 26 12/22/2020    ANIONGAP 8 07/15/2022    ANIONGAP 6 12/22/2020     (H) 07/15/2022    GLC 84 02/09/2021    BUN 10 07/15/2022    BUN 10 12/22/2020    CR 0.63 07/15/2022    CR 0.64 12/22/2020    GFRESTIMATED >90 07/15/2022    GFRESTIMATED >90 12/22/2020    GFRESTBLACK >90 12/22/2020    EDISON 9.4 07/15/2022    EDISON 9.3 12/22/2020        A1C RESULTS:  Lab Results   Component Value Date    A1C 6.0 (H) 06/22/2022    A1C 5.9 (H) 02/09/2021       INR RESULTS:  No results found for: INR    CC  [place]  [address] [place] [address]    All medical records were reviewed in detail and discussed with the patient. Greater than 30 mins were spent with the patient, 50% of this time was spent on counseling and coordination of care.  After visit summary was printed and given to the patient.     PAST SURGICAL HISTORY:  History of tonsillectomy

## 2022-08-01 NOTE — LETTER
8/1/2022    Isreal QuocDO  [address]  [facility] [address]    RE: Lulú BHATTI Dung       Dear Colleague,     I had the pleasure of seeing Lulú Razo in the [facility].  HPI and Plan:   Today I had the pleasure of seeing Lulú Razo at [facility]. She is a pleasant 57 year old patient with a past medical history of thyroid nodules, hypertension who presents to the clinic in consultation for sinus tachycardia after being referred by her primary care physician.  The patient has noted sinus tachycardia for quite some time and notes dating back to 2020 show heart rates in low 100s and high 90s.  Her thyroid function is normal.  The patient tells me that she is perimenopausal and is mostly lettuce in her life.  She was having generalized fatigue and throbbing sensation in her neck.  Her symptoms improved after she was started on chlorthalidone sometime ago for hypertension.  Since then she has noticed more energy, has lost some weight and throbbing in the neck has resolved.  She has also cut down on smoking and only smokes 2 cigarettes a day now.  She is planning to eat healthy and start to exercise.    She denies chest pain, shortness of breath, orthopnea, PND, dizziness, presyncope or syncope.  Her fasting lipid profile shows LDL cholesterol of 129 mg/dL and total cholesterol of 210 mg/dL.  Her ALT is 59 and AST is 32.    Assessment and plan  1.  Sinus tachycardia  2.  Essential hypertension  3.  Chronic smoker  4.  BMI 33 kg per metered square    She has felt significantly better since starting chlorthalidone which I will continue.  We also talked about lifestyle modifications such as eating healthy diet, exercising regularly and the need to quit smoking.  I do not have a clear-cut etiology for her sinus tachycardia but I believe it is most likely due to to the her being chronic smoker, perimenopause and deconditioning.  Her TSH and T4 are normal.  I do not  believe this needs further aggressive work-up at this time besides getting her regular transthoracic echocardiogram which I will order today.  I will reach out to her with the results of the test.  I will have her come back and see me on as-needed basis going forward.    Thank you for allowing me to participate in the care of Lulú Razo    This note was completed in part using Dragon voice recognition software. Although reviewed after completion, some word and grammatical errors may occur.    Surinder Mcmullen MD  Cardiology    No orders of the defined types were placed in this encounter.      No orders of the defined types were placed in this encounter.      There are no discontinued medications.    Encounter Diagnosis   Name Primary?     Sinus tachycardia        CURRENT MEDICATIONS:  Current Outpatient Medications   Medication Sig Dispense Refill     chlorthalidone (HYGROTON) 25 MG tablet Take 1 tablet (25 mg) by mouth daily 90 tablet 3       ALLERGIES     Allergies   Allergen Reactions     Quinolones Hives       PAST MEDICAL HISTORY:  History reviewed. No pertinent past medical history.    PAST SURGICAL HISTORY:  Past Surgical History:   Procedure Laterality Date     ABDOMEN SURGERY  1984    Gallbladder     CHOLECYSTECTOMY      1985     GYN SURGERY      D&C, Oophrectomy- 2004     ORTHOPEDIC SURGERY      surgey for broken nose       FAMILY HISTORY:  History reviewed. No pertinent family history.    SOCIAL HISTORY:  Social History     Socioeconomic History     Marital status:      Spouse name: None     Number of children: None     Years of education: None     Highest education level: None   Tobacco Use     Smoking status: Current Every Day Smoker     Packs/day: 0.50     Years: 0.00     Pack years: 0.00     Types: Cigarettes     Smokeless tobacco: Never Used     Tobacco comment: 2x day.. wants to quiet   Vaping Use     Vaping Use: Never used   Substance and Sexual Activity     Alcohol use: Not Currently  "    Drug use: Never     Sexual activity: Not Currently     Partners: Male       Review of Systems:  Skin:  not assessed       Eyes:  not assessed      ENT:  not assessed      Respiratory:  Positive for wheezing seasonally gets wheezing.   Cardiovascular:    palpitations;dizziness palpitations on and off - less since stopping drinking coffee. Dizziness after working a long time on the computer.  Gastroenterology: not assessed      Genitourinary:  not assessed      Musculoskeletal:  not assessed      Neurologic:  not assessed      Psychiatric:  not assessed      Heme/Lymph/Imm:  not assessed      Endocrine:  Negative   pre-diabetic, thyroid has nodule but no action needed now.    Physical Exam:  Vitals: BP (!) 146/88   Pulse 100   Ht 1.632 m (5' 4.25\")   Wt 88.9 kg (196 lb)   SpO2 98%   BMI 33.38 kg/m    Eyes: No icterus.  Pulmonary: Chest symmetric, lungs clear bilaterally and no crackles, wheezes or rales.  Cardiovascular: RRR with normal S1 and S2, no murmur, JVP normal.  Musculoskeletal: Edema of the lower extremities: None.  Neurologic: Oriented and appropriate without obvious focal deficits.   Psychiatric: Normal affect.     Recent Lab Results:  LIPID RESULTS:  Lab Results   Component Value Date    CHOL 210 (H) 07/15/2022    CHOL 210 (H) 02/09/2021    HDL 58 07/15/2022    HDL 64 02/09/2021     (H) 07/15/2022     (H) 02/09/2021    TRIG 126 07/15/2022    TRIG 107 02/09/2021       LIVER ENZYME RESULTS:  Lab Results   Component Value Date    AST 32 07/15/2022    ALT 59 (H) 07/15/2022       CBC RESULTS:  Lab Results   Component Value Date    WBC 9.4 06/22/2022    WBC 9.1 12/22/2020    RBC 4.62 06/22/2022    RBC 4.44 12/22/2020    HGB 14.8 06/22/2022    HGB 14.2 12/22/2020    HCT 42.8 06/22/2022    HCT 41.6 12/22/2020    MCV 93 06/22/2022    MCV 94 12/22/2020    MCH 32.0 06/22/2022    MCH 32.0 12/22/2020    MCHC 34.6 06/22/2022    MCHC 34.1 12/22/2020    RDW 12.1 06/22/2022    RDW 11.6 12/22/2020    "  06/22/2022     12/22/2020       BMP RESULTS:  Lab Results   Component Value Date     07/15/2022     12/22/2020    POTASSIUM 3.6 07/15/2022    POTASSIUM 3.6 12/22/2020    CHLORIDE 100 07/15/2022    CHLORIDE 106 12/22/2020    CO2 28 07/15/2022    CO2 26 12/22/2020    ANIONGAP 8 07/15/2022    ANIONGAP 6 12/22/2020     (H) 07/15/2022    GLC 84 02/09/2021    BUN 10 07/15/2022    BUN 10 12/22/2020    CR 0.63 07/15/2022    CR 0.64 12/22/2020    GFRESTIMATED >90 07/15/2022    GFRESTIMATED >90 12/22/2020    GFRESTBLACK >90 12/22/2020    EDISON 9.4 07/15/2022    EDISON 9.3 12/22/2020        A1C RESULTS:  Lab Results   Component Value Date    A1C 6.0 (H) 06/22/2022    A1C 5.9 (H) 02/09/2021       INR RESULTS:  No results found for: INR    CC  [place] Kum[place]  [address] [address] [id number]    All medical records were reviewed in detail and discussed with the patient. Greater than 30 mins were spent with the patient, 50% of this time was spent on counseling and coordination of care.  After visit summary was printed and given to the patient.    Thank you for allowing me to participate in the care of your patient.      Sincerely,     Surinder Mcmullen MD     [facility]  cc:   [place]real Kum[place]  [address] [place] [address]

## 2022-08-10 ENCOUNTER — ALLIED HEALTH/NURSE VISIT (OUTPATIENT)
Dept: EDUCATION SERVICES | Facility: CLINIC | Age: 57
End: 2022-08-10
Attending: FAMILY MEDICINE
Payer: COMMERCIAL

## 2022-08-10 DIAGNOSIS — R73.03 PREDIABETES: ICD-10-CM

## 2022-08-10 PROCEDURE — G0108 DIAB MANAGE TRN  PER INDIV: HCPCS | Mod: AE | Performed by: DIETITIAN, REGISTERED

## 2022-08-10 NOTE — PATIENT INSTRUCTIONS
MY DIABETES TODAY:    1)  Goal A1C is under <7.0  Mine is:      Lab Results   Component Value Date    A1C 6.0 06/22/2022    A1C 5.9 02/09/2021       2)  Goal LDL (bad cholesterol) under 100  (measured at least yearly)- I am currently at:   Lab Results   Component Value Date     07/15/2022     02/09/2021       3)  Goal blood pressure under 130/80- mine was Data Unavailable today    Care Plan:  Meal Plan Recommendation: eat 3 meals a day, have small snacks between meals, if needed, and use portion control  Exercise / activity plan: 150+ minutes weekly    Follow up:  Call ([phone]), e-mail ([email or web address]), or send [employer] message with questions, concerns or if follow-up is needed.     Bring blood glucose meter and logbook with you to all doctor and follow-up appointments.     [facility] for the [facility]:  For Your Diabetes or Nutrition Education Appointments Call:  [phone]   For Diabetes or Nutrition Related Questions:   [phone]  Send [employer] Message   If you need a medication refill please contact your pharmacy. Please allow 3 business days for your refills to be completed.

## 2022-08-10 NOTE — PROGRESS NOTES
Diabetes Self-Management Education & Support    Presents for: Initial Assessment for new diagnosis    Type of Visit: In Person    How would patient like to obtain AVS? MyChart    ASSESSMENT:    Continue the lifestyle changes you have already started.  Aim for ~30-45 gm carbohydrate per meal.  Space meals by ~4-5 hours.  If eating a snack, aim for <15 grams.  Use plate planning model - 1/4 plate starch, 1/4 plate lean protein, 1/2 plate veggies/fruit  Aim for 30 minutes exercise 5 times per week  Great job on your weight loss!        Patient's most recent   Lab Results   Component Value Date    A1C 6.0 06/22/2022    A1C 5.9 02/09/2021    is meeting goal of <7.0    Diabetes knowledge and skills assessment:   Patient is knowledgeable in diabetes management concepts related to: Being Active    Continue education with the following diabetes management concepts: Healthy Eating and Healthy Coping    Based on learning assessment above, most appropriate setting for further diabetes education would be: Individual setting.    INTERVENTIONS:    Education provided today on:  AADE Self-Care Behaviors:  Diabetes Pathophysiology  Healthy Eating: carbohydrate counting, consistency in amount, composition, and timing of food intake, portion control, plate planning method and label reading  Being Active: relationship to blood glucose and describe appropriate activity program  Healthy Coping: benefits of making appropriate lifestyle changes    Opportunities for ongoing education and support in diabetes-self management were discussed. Pt verbalized understanding of concepts discussed and recommendations provided today.       Education Materials Provided:  [facility] Understanding Diabetes Booklet and My Plate Planner          PLAN    Carb controlled diet with 30-45 grams per meal, 0-15 grams per snack  150+ minutes exercise weekly  Goal 15 lb weight loss    Topics to cover at upcoming visits: none  Follow-up: as needed    See  "Goals Section for co-developed, patient-stated behavior change goals.  AVS provided to patient today.          SUBJECTIVE / OBJECTIVE:  Presents for: Initial Assessment for new diagnosis  Accompanied by: Self  Diabetes education in the past 24mo: No  Diabetes type: Other  Disease course: Other  How confident are you filling out medical forms by yourself:: Quite a bit  Diabetes management related comments/concerns:  said I m pre-diabetic so I will be looking to make changes to prevent full diabetes  Other concerns:: None  Cultural Influences/Ethnic Background:  Not  or       Diabetes Symptoms & Complications:  Fatigue: Yes  Neuropathy: No  Polydipsia: Yes  Polyphagia: No  Polyuria: No  Visual change: Yes  Slow healing wounds: No  Weight trend: Decreasing (down ~10#)  Autonomic neuropathy: No  CVA: No  Heart disease: No  Nephropathy: No  Peripheral neuropathy: No  Peripheral Vascular Disease: No  Retinopathy: No  Sexual dysfunction: No    Patient Problem List and Family Medical History reviewed for relevant medical history, current medical status, and diabetes risk factors.    Vitals:  There were no vitals taken for this visit.  Estimated body mass index is 33.38 kg/m  as calculated from the following:    Height as of 8/1/22: 1.632 m (5' 4.25\").    Weight as of 8/1/22: 88.9 kg (196 lb).   Last 3 BP:   BP Readings from Last 3 Encounters:   08/01/22 (!) 146/88   07/15/22 139/85   06/22/22 (!) 160/92       History   Smoking Status     Current Every Day Smoker     Packs/day: 0.50     Years: 0.00     Types: Cigarettes   Smokeless Tobacco     Never Used     Comment: 2x day.. wants to quiet       Labs:  Lab Results   Component Value Date    A1C 6.0 06/22/2022    A1C 5.9 02/09/2021     Lab Results   Component Value Date     07/15/2022    GLC 84 02/09/2021     Lab Results   Component Value Date     07/15/2022     02/09/2021     HDL Cholesterol   Date Value Ref Range Status   02/09/2021 64 " >49 mg/dL Final     Direct Measure HDL   Date Value Ref Range Status   07/15/2022 58 >=50 mg/dL Final   ]  GFR Estimate   Date Value Ref Range Status   07/15/2022 >90 >60 mL/min/1.73m2 Final     Comment:     Effective December 21, 2021 eGFRcr in adults is calculated using the 2021 CKD-EPI creatinine equation which includes age and gender (Ezekiel et al., NE, DOI: 10.1056/NIHAkf1930153)   12/22/2020 >90 >60 mL/min/[1.73_m2] Final     Comment:     Non  GFR Calc  Starting 12/18/2018, serum creatinine based estimated GFR (eGFR) will be   calculated using the Chronic Kidney Disease Epidemiology Collaboration   (CKD-EPI) equation.       GFR Estimate If Black   Date Value Ref Range Status   12/22/2020 >90 >60 mL/min/[1.73_m2] Final     Comment:      GFR Calc  Starting 12/18/2018, serum creatinine based estimated GFR (eGFR) will be   calculated using the Chronic Kidney Disease Epidemiology Collaboration   (CKD-EPI) equation.       Lab Results   Component Value Date    CR 0.63 07/15/2022    CR 0.64 12/22/2020     No results found for: MICROALBUMIN    Healthy Eating:  Healthy Eating Assessed Today: Yes  Cultural/Episcopal diet restrictions?: No  Meal planning/habits: Avoiding sweets, Low carb, Heart healthy, Low salt  How many times a week on average do you eat food made away from home (restaurant/take-out)?: 1  Meals include: Lunch, Dinner  Lunch: egg, turkey sausage OR egg on 12 grain bread OR salad, yogurt, fruit (peach, banana, pear) OR leftovers (chicken, broccoli)  Dinner: tacos - low carb tortilla, beans, rice, cheese, sour cream OR chicken, broccoli, rice  Snacks: Greek yogurt or banana  Beverages: Water, Coffee  Has patient met with a dietitian in the past?: No    Being Active:  Being Active Assessed Today: Yes  Exercise:: Yes  Days per week of moderate to strenuous exercise (like a brisk walk): 3  On average, minutes per day of exercise at this level: 30  How intense was your typical  exercise? : Moderate (like brisk walking)  Exercise Minutes per Week: 90  Barrier to exercise: None    Monitoring:  Monitoring Assessed Today: No  Did patient bring glucose meter to appointment? : No  Times checking blood sugar at home (number): Never    Taking Medications:      Taking Medication Assessed Today: Yes  Current Treatments: None    Problem Solving:  Problem Solving Assessed Today: No              Reducing Risks:  Reducing Risks Assessed Today: No  CAD Risks: Hypertension, Obesity, Post-menopausal, Sedentary lifestyle, Stress  Has dilated eye exam at least once a year?: No  Sees dentist every 6 months?: No  Feet checked by healthcare provider in the last year?: No    Healthy Coping:  Healthy Coping Assessed Today: Yes  Emotional response to diabetes: Ready to learn  Informal Support system:: Children, Spouse  Stage of change: ACTION (Actively working towards change)  Support resources: Other  Patient Activation Measure Survey Score:  KUSHAL Score (Last Two) 2/8/2021   KUSHAL Raw Score 30   Activation Score 56   KUSHAL Level 3             Amanda Duran RDN LD CDCES    Time Spent: 40 minutes  Encounter Type: Individual        Any diabetes medication dose changes were made via the Certified Diabetes Care & Education Protocol in collaboration with the patient's referring provider. A copy of this encounter was shared with the provider.

## 2022-08-10 NOTE — LETTER
8/10/2022         RE: Lulú Razo  [address] [facility] [id number]        Dear Colleague,    Thank you for referring your patient, Lulú Razo, to the [facility]. Please see a copy of my visit note below.    Diabetes Self-Management Education & Support    Presents for: Initial Assessment for new diagnosis    Type of Visit: In Person    How would patient like to obtain AVS? MyChart    ASSESSMENT:    Continue the lifestyle changes you have already started.  Aim for ~30-45 gm carbohydrate per meal.  Space meals by ~4-5 hours.  If eating a snack, aim for <15 grams.  Use plate planning model - 1/4 plate starch, 1/4 plate lean protein, 1/2 plate veggies/fruit  Aim for 30 minutes exercise 5 times per week  Great job on your weight loss!        Patient's most recent   Lab Results   Component Value Date    A1C 6.0 06/22/2022    A1C 5.9 02/09/2021    is meeting goal of <7.0    Diabetes knowledge and skills assessment:   Patient is knowledgeable in diabetes management concepts related to: Being Active    Continue education with the following diabetes management concepts: Healthy Eating and Healthy Coping    Based on learning assessment above, most appropriate setting for further diabetes education would be: Individual setting.    INTERVENTIONS:    Education provided today on:  AADE Self-Care Behaviors:  Diabetes Pathophysiology  Healthy Eating: carbohydrate counting, consistency in amount, composition, and timing of food intake, portion control, plate planning method and label reading  Being Active: relationship to blood glucose and describe appropriate activity program  Healthy Coping: benefits of making appropriate lifestyle changes    Opportunities for ongoing education and support in diabetes-self management were discussed. Pt verbalized understanding of concepts discussed and recommendations provided today.       Education Materials Provided:  [facility]  "Understanding Diabetes Booklet and My Plate Planner          PLAN    Carb controlled diet with 30-45 grams per meal, 0-15 grams per snack  150+ minutes exercise weekly  Goal 15 lb weight loss    Topics to cover at upcoming visits: none  Follow-up: as needed    See Goals Section for co-developed, patient-stated behavior change goals.  AVS provided to patient today.          SUBJECTIVE / OBJECTIVE:  Presents for: Initial Assessment for new diagnosis  Accompanied by: Self  Diabetes education in the past 24mo: No  Diabetes type: Other  Disease course: Other  How confident are you filling out medical forms by yourself:: Quite a bit  Diabetes management related comments/concerns:  said I m pre-diabetic so I will be looking to make changes to prevent full diabetes  Other concerns:: None  Cultural Influences/Ethnic Background:  Not  or       Diabetes Symptoms & Complications:  Fatigue: Yes  Neuropathy: No  Polydipsia: Yes  Polyphagia: No  Polyuria: No  Visual change: Yes  Slow healing wounds: No  Weight trend: Decreasing (down ~10#)  Autonomic neuropathy: No  CVA: No  Heart disease: No  Nephropathy: No  Peripheral neuropathy: No  Peripheral Vascular Disease: No  Retinopathy: No  Sexual dysfunction: No    Patient Problem List and Family Medical History reviewed for relevant medical history, current medical status, and diabetes risk factors.    Vitals:  There were no vitals taken for this visit.  Estimated body mass index is 33.38 kg/m  as calculated from the following:    Height as of 8/1/22: 1.632 m (5' 4.25\").    Weight as of 8/1/22: 88.9 kg (196 lb).   Last 3 BP:   BP Readings from Last 3 Encounters:   08/01/22 (!) 146/88   07/15/22 139/85   06/22/22 (!) 160/92       History   Smoking Status     Current Every Day Smoker     Packs/day: 0.50     Years: 0.00     Types: Cigarettes   Smokeless Tobacco     Never Used     Comment: 2x day.. wants to quiet       Labs:  Lab Results   Component Value Date    A1C 6.0 " 06/22/2022    A1C 5.9 02/09/2021     Lab Results   Component Value Date     07/15/2022    GLC 84 02/09/2021     Lab Results   Component Value Date     07/15/2022     02/09/2021     HDL Cholesterol   Date Value Ref Range Status   02/09/2021 64 >49 mg/dL Final     Direct Measure HDL   Date Value Ref Range Status   07/15/2022 58 >=50 mg/dL Final   ]  GFR Estimate   Date Value Ref Range Status   07/15/2022 >90 >60 mL/min/1.73m2 Final     Comment:     Effective December 21, 2021 eGFRcr in adults is calculated using the 2021 CKD-EPI creatinine equation which includes age and gender (Ezekiel et al., NEJM, DOI: 10.1056/KRRRlv2387132)   12/22/2020 >90 >60 mL/min/[1.73_m2] Final     Comment:     Non  GFR Calc  Starting 12/18/2018, serum creatinine based estimated GFR (eGFR) will be   calculated using the Chronic Kidney Disease Epidemiology Collaboration   (CKD-EPI) equation.       GFR Estimate If Black   Date Value Ref Range Status   12/22/2020 >90 >60 mL/min/[1.73_m2] Final     Comment:      GFR Calc  Starting 12/18/2018, serum creatinine based estimated GFR (eGFR) will be   calculated using the Chronic Kidney Disease Epidemiology Collaboration   (CKD-EPI) equation.       Lab Results   Component Value Date    CR 0.63 07/15/2022    CR 0.64 12/22/2020     No results found for: MICROALBUMIN    Healthy Eating:  Healthy Eating Assessed Today: Yes  Cultural/Spiritism diet restrictions?: No  Meal planning/habits: Avoiding sweets, Low carb, Heart healthy, Low salt  How many times a week on average do you eat food made away from home (restaurant/take-out)?: 1  Meals include: Lunch, Dinner  Lunch: egg, turkey sausage OR egg on 12 grain bread OR salad, yogurt, fruit (peach, banana, pear) OR leftovers (chicken, broccoli)  Dinner: tacos - low carb tortilla, beans, rice, cheese, sour cream OR chicken, broccoli, rice  Snacks: Greek yogurt or banana  Beverages: Water, Coffee  Has patient  met with a dietitian in the past?: No    Being Active:  Being Active Assessed Today: Yes  Exercise:: Yes  Days per week of moderate to strenuous exercise (like a brisk walk): 3  On average, minutes per day of exercise at this level: 30  How intense was your typical exercise? : Moderate (like brisk walking)  Exercise Minutes per Week: 90  Barrier to exercise: None    Monitoring:  Monitoring Assessed Today: No  Did patient bring glucose meter to appointment? : No  Times checking blood sugar at home (number): Never    Taking Medications:      Taking Medication Assessed Today: Yes  Current Treatments: None    Problem Solving:  Problem Solving Assessed Today: No              Reducing Risks:  Reducing Risks Assessed Today: No  CAD Risks: Hypertension, Obesity, Post-menopausal, Sedentary lifestyle, Stress  Has dilated eye exam at least once a year?: No  Sees dentist every 6 months?: No  Feet checked by healthcare provider in the last year?: No    Healthy Coping:  Healthy Coping Assessed Today: Yes  Emotional response to diabetes: Ready to learn  Informal Support system:: Children, Spouse  Stage of change: ACTION (Actively working towards change)  Support resources: Other  Patient Activation Measure Survey Score:  KUSHAL Score (Last Two) 2/8/2021   KUSHAL Raw Score 30   Activation Score 56   KUSHAL Level 3             Amanda Duran RDN LD CDCES    Time Spent: 40 minutes  Encounter Type: Individual        Any diabetes medication dose changes were made via the Certified Diabetes Care & Education Protocol in collaboration with the patient's referring provider. A copy of this encounter was shared with the provider.

## 2022-08-11 ENCOUNTER — HOSPITAL ENCOUNTER (OUTPATIENT)
Dept: CARDIOLOGY | Facility: CLINIC | Age: 57
Discharge: HOME | End: 2022-08-11
Attending: INTERNAL MEDICINE | Admitting: INTERNAL MEDICINE
Payer: COMMERCIAL

## 2022-08-11 ENCOUNTER — TELEPHONE (OUTPATIENT)
Dept: CARDIOLOGY | Facility: CLINIC | Age: 57
End: 2022-08-11

## 2022-08-11 DIAGNOSIS — R00.0 SINUS TACHYCARDIA: ICD-10-CM

## 2022-08-11 LAB — LVEF ECHO: NORMAL

## 2022-08-11 PROCEDURE — 93306 TTE W/DOPPLER COMPLETE: CPT | Mod: 26 | Performed by: INTERNAL MEDICINE

## 2022-08-11 PROCEDURE — 93306 TTE W/DOPPLER COMPLETE: CPT

## 2022-08-11 NOTE — TELEPHONE ENCOUNTER
has received results from the Echocardiogram today, Dr. Mcmullen has reviewed.      Noted in Dr. Mcmullen's office visit, he said that we would follow up as needed basis. He also strongly recommends that Lulú stop smoking.       has sent a [employer] Resume message to Lulú, invited her to call or MyChart us with any concerns or questions that she has.  Yun Rosas RN on 8/11/2022 at 3:55 PM

## 2022-09-24 ENCOUNTER — HEALTH MAINTENANCE LETTER (OUTPATIENT)
Age: 57
End: 2022-09-24

## 2022-10-29 ENCOUNTER — HOSPITAL ENCOUNTER (EMERGENCY)
Facility: CLINIC | Age: 57
Discharge: HOME | End: 2022-10-29
Attending: EMERGENCY MEDICINE | Admitting: EMERGENCY MEDICINE
Payer: COMMERCIAL

## 2022-10-29 VITALS
RESPIRATION RATE: 16 BRPM | OXYGEN SATURATION: 98 % | HEART RATE: 98 BPM | DIASTOLIC BLOOD PRESSURE: 117 MMHG | SYSTOLIC BLOOD PRESSURE: 151 MMHG | TEMPERATURE: 98 F

## 2022-10-29 DIAGNOSIS — R03.0 ELEVATED BLOOD PRESSURE READING: ICD-10-CM

## 2022-10-29 DIAGNOSIS — R60.0 SWELLING OF RIGHT PAROTID GLAND: ICD-10-CM

## 2022-10-29 PROCEDURE — 99283 EMERGENCY DEPT VISIT LOW MDM: CPT

## 2022-10-29 PROCEDURE — 250N000011 HC RX IP 250 OP 636: Performed by: EMERGENCY MEDICINE

## 2022-10-29 RX ORDER — DEXAMETHASONE 4 MG/1
12 TABLET ORAL ONCE
Status: COMPLETED | OUTPATIENT
Start: 2022-10-29 | End: 2022-10-29

## 2022-10-29 RX ADMIN — DEXAMETHASONE 12 MG: 4 TABLET ORAL at 20:21

## 2022-10-30 NOTE — ED PROVIDER NOTES
History   Chief Complaint:  Facial Swelling       HPI   Lulú Razo is a 57 year old female with who presents with right-sided facial swelling.  Initially noted swelling to the ear at the angle of the jaw before generalized cheek.  Swelling is gone down since her initial arrival in the triage area.  She did not take any specific medications for it.  There is no associated fever, sore throat, difficulty breathing or swallowing.  Denies using lisinopril or recent steroid use.      Review of Systems  Relevant ROS as above.     Allergies:    Quinolones    Medications:    chlorthalidone (HYGROTON) 25 MG tablet        Past Medical History:       Patient Active Problem List   Diagnosis     Flexor tenosynovitis of finger     Cat bite of finger, initial encounter     CECILIA (generalized anxiety disorder)     Tinnitus, bilateral     Class 2 obesity due to excess calories without serious comorbidity with body mass index (BMI) of 35.0 to 35.9 in adult     Smoking     S/P cholecystectomy        Past Surgical History:      Past Surgical History:   Procedure Laterality Date     ABDOMEN SURGERY  1984    Gallbladder     CHOLECYSTECTOMY      1985     GYN SURGERY      D&C, Oophrectomy- 2004     ORTHOPEDIC SURGERY      surgey for broken nose        Family History:      No family history on file.    Social History:  Presents accompanied  PCP: Isreal Kumari     Physical Exam     Patient Vitals for the past 24 hrs:   BP Temp Temp src Pulse Resp SpO2   10/29/22 1912 (!) 151/117 98  F (36.7  C) Temporal 98 16 98 %       Physical Exam  Skin:  Swelling over R parotid region without erythema, induration, or fluctuance  ENT:  Prominent inner cheek tissue on R without drainage or firmness, no sublingual firmness, no evidence for dental infection; TM normal; posterior oropharyx normal  LN:  No tender cervical adenopathy  Resp:  Non-labored, no stridor or wheezing  Neuro:  Alert and cooperative  MSkel:  Moving all extremities    Emergency  Department Course   ECG  No results found for this or any previous visit.    Imaging:  No orders to display     Laboratory:  Labs Ordered and Resulted from Time of ED Arrival to Time of ED Departure - No data to display     Interventions:  Medications   dexamethasone (DECADRON) tablet 12 mg (12 mg Oral Given 10/29/22 2021)      Disposition:  The patient was discharged to home.     Impression & Plan     Medical Decision Making:  Area of symptoms is most consistent with an etiology from the parotid gland.  There is no evidence for associated otitis media, odontogenic infection, or deep space infection of the neck.  CT is not indicated.  She does not have bilateral symptoms to increase concern for mumps and furthermore has no infectious symptoms.  Given the rapid changes in size that she is noted, this could be secondary to a salivary duct stone.  Parotid gland infection is also on the differential given her tenderness.  Augmentin prescribed.  Discussed sucking on hard lozenges to promote clearance.  Decadron given to help limit swelling.  Return immediately if symptoms are worsening, fever develops, or bilateral symptoms develop.    Regarding the elevated BP, there is no chest pain or headache to suggest end organ dysfunction.  She will need close follow-up in clinic for reassessment and medication adjustment if persistently elevated.     Diagnosis:    ICD-10-CM    1. Swelling of right parotid gland  R60.0       2. Elevated blood pressure reading  R03.0           Discharge Medications:  Discharge Medication List as of 10/29/2022  8:19 PM      START taking these medications    Details   amoxicillin-clavulanate (AUGMENTIN) 875-125 MG tablet Take 1 tablet by mouth 2 times daily for 7 days, Disp-14 tablet, R-0, E-Prescribe                Sheron Martinez MD  11/03/22 9290

## 2022-10-30 NOTE — DISCHARGE INSTRUCTIONS
Start antibiotics if not improving in 2-3 days or if mild worsening    Suck on hard candy or throat lozenge to help promote drainage from parotid gland    Prescription strength dosing instructions:  - 600mg ibuprofen (Advil, Motrin) every 6 hours as needed for fever/pain/inflammation.  Naprosyn (Naproxen, Aleve) works similarly and can be used instead, if preferred.  - 650mg acetaminophen (tylenol) every 4-6 hours or 1000mg every 6-8 hours (maximum of 3000mg in 24 hours).  Acetaminophen is often in combination over the counter and prescription pain medications.  Be sure to include this in daily total.    These medications work differently and can be used in combination.

## 2022-10-30 NOTE — ED TRIAGE NOTES
Pt presents with swelling to the right of face inferior to ear and into neck on same size. She denies pain the swelling, difficulty swallowing, or dyspnea. Denies using lisinopril or recent steroid use.      Triage Assessment     Row Name 10/29/22 1910       Triage Assessment (Adult)    Airway WDL WDL       Respiratory WDL    Respiratory WDL WDL       Skin Circulation/Temperature WDL    Skin Circulation/Temperature WDL WDL       Cardiac WDL    Cardiac WDL WDL       Peripheral/Neurovascular WDL    Peripheral Neurovascular WDL WDL       Cognitive/Neuro/Behavioral WDL    Cognitive/Neuro/Behavioral WDL WDL

## 2023-02-20 ENCOUNTER — TELEPHONE (OUTPATIENT)
Dept: FAMILY MEDICINE | Facility: CLINIC | Age: 58
End: 2023-02-20
Payer: COMMERCIAL

## 2023-02-20 NOTE — TELEPHONE ENCOUNTER
Summary:    Patient is due/failing the following:   MAMMOGRAM    Reviewed:    [] CARE EVERYWHERE  [] LAST OV NOTE   [] FYI TAB  [] MYCHART ACTIVE?  [] LAST PANEL ENCOUNTER  [] FUTURE APPTS  [] IMMUNIZATIONS  [] Media Tab            Action needed:   mammo    Type of outreach:    Phone, left message for patient to call back.  and Sent MyChart message.                                                                               Michelle Oliva/TEA  [place]---[facility]

## 2023-04-03 ENCOUNTER — TELEPHONE (OUTPATIENT)
Dept: FAMILY MEDICINE | Facility: CLINIC | Age: 58
End: 2023-04-03
Payer: COMMERCIAL

## 2023-04-03 NOTE — TELEPHONE ENCOUNTER
Summary:    Patient is due/failing the following:   PAP    Reviewed:    [] CARE EVERYWHERE  [] LAST OV NOTE   [] FYI TAB  [] MYCHART ACTIVE?  [] LAST PANEL ENCOUNTER  [] FUTURE APPTS  [] IMMUNIZATIONS  [] Media Tab            Action needed:   Patient needs office visit for pap.    Type of outreach:    Phone, left message for patient to call back.  and Sent MyChart message.                                                                               Michelle Oliva/TEA  [place]---[facility]

## 2023-04-03 NOTE — LETTER
[facility]  [address] [address] [phone]  April 3, 2023    Lulú Razo  [address] [address]    Dear Lulú,    I care about your health and have reviewed your health plan. I have reviewed your medical conditions, medication list, and lab results and am making recommendations based on this review, to better manage your health.    You are in particular need of attention regarding:  -Cervical Cancer Screening    I am recommending that you:  {recommendations:-schedule a PAP SMEAR EXAM which is due.  Please disregard this reminder if you have had this exam elsewhere within the last year.  It would be helpful for us to have a copy of your recent pap smear report in our file so that we can best coordinate your care.    Here is a list of Health Maintenance topics that are due now or due soon:  Health Maintenance Due   Topic Date Due    HF ACTION PLAN  Never done    ADVANCE CARE PLANNING  Never done    MAMMO SCREENING  Never done    HEPATITIS B IMMUNIZATION (1 of 3 - 3-dose series) Never done    COVID-19 Vaccine (1) Never done    Pneumococcal Vaccine: Pediatrics (0 to 5 Years) and At-Risk Patients (6 to 64 Years) (1 - PCV) Never done    COLORECTAL CANCER SCREENING  Never done    PAP  Never done    ZOSTER IMMUNIZATION (1 of 2) Never done    LUNG CANCER SCREENING  Never done    YEARLY PREVENTIVE VISIT  02/09/2022    INFLUENZA VACCINE (1) 09/01/2022    PHQ-2 (once per calendar year)  01/01/2023    BMP  01/15/2023       Please call us at [phone] (or use [employer]) to address the above recommendations.     Thank you for trusting [facility] and we appreciate the opportunity to serve you.  We look forward to supporting your healthcare needs in the future.    Healthy Regards,    [place] Care Team/lf

## 2023-05-08 ENCOUNTER — HEALTH MAINTENANCE LETTER (OUTPATIENT)
Age: 58
End: 2023-05-08

## 2023-06-09 ENCOUNTER — TELEPHONE (OUTPATIENT)
Dept: FAMILY MEDICINE | Facility: CLINIC | Age: 58
End: 2023-06-09
Payer: COMMERCIAL

## 2023-06-09 NOTE — TELEPHONE ENCOUNTER
Summary:    Patient is due/failing the following:   MAMMOGRAM    Reviewed:    [] CARE EVERYWHERE  [] LAST OV NOTE   [] FYI TAB  [] MYCHART ACTIVE?  [] LAST PANEL ENCOUNTER  [] FUTURE APPTS  [] IMMUNIZATIONS  [] Media Tab            Action needed:   Patient needs referral/order: mammo    Type of outreach:    Phone, left message for patient to call back.                                                                                Michelle Oliva/TEA  [place]---[facility]

## 2023-06-15 ENCOUNTER — TELEPHONE (OUTPATIENT)
Dept: FAMILY MEDICINE | Facility: CLINIC | Age: 58
End: 2023-06-15
Payer: COMMERCIAL

## 2023-06-15 NOTE — LETTER
[facility]  [address] [address] [phone]  June 26, 2023    Lulú Razo  [address] [address]    Dear Lulú,    I care about your health and have reviewed your health plan. I have reviewed your medical conditions, medication list, and lab results and am making recommendations based on this review, to better manage your health.    You are in particular need of attention regarding:  -Breast Cancer Screening  -Colon Cancer Screening  -Cervical Cancer Screening    I am recommending that you:  {recommendations:-schedule a MAMMOGRAM which is due. We have mammogram available at our clinic; please call [phone].   Please disregard this reminder if you have had this exam elsewhere within the last year.  It would be helpful for us to have a copy of your mammogram report in our file so that we can best coordinate your care.  -schedule a COLONOSCOPY to look for colon cancer (due every 10 years or 5 years in higher risk situations.)   Colon cancer is now the second leading cause of death in the [place] for both men and women and there are over 130,000 new cases and 50,000 deaths per year from colon cancer.  Colonoscopies can prevent 90-95% of these deaths.  Problem lesions can be removed before they ever become cancer.  This test is not only looking for cancer, but also getting rid of precancerious lesions.  If you do not wish to do a colonoscopy or cannot afford to do one, at this time, there is another option. It is called a FIT test or Fecal Immunochemical Occult Blood Test (take home stool sample kit).  It does not replace the colonoscopy for colorectal cancer screening, but it can detect hidden bleeding in the lower colon.  It does need to be repeated every year and if a positive result is obtained, you would be referred for a colonoscopy.  If you have completed either one of these tests at another facility,  please have the records sent to our clinic so that we can best coordinate your care.  -schedule a PAP SMEAR EXAM which is due.  Please disregard this reminder if you have had this exam elsewhere within the last year.  It would be helpful for us to have a copy of your recent pap smear report in our file so that we can best coordinate your care.    Here is a list of Health Maintenance topics that are due now or due soon:  Health Maintenance Due   Topic Date Due    HF ACTION PLAN  Never done    ADVANCE CARE PLANNING  Never done    MAMMO SCREENING  Never done    HEPATITIS B IMMUNIZATION (1 of 3 - 3-dose series) Never done    COVID-19 Vaccine (1) Never done    Pneumococcal Vaccine: Pediatrics (0 to 5 Years) and At-Risk Patients (6 to 64 Years) (1 - PCV) Never done    COLORECTAL CANCER SCREENING  Never done    PAP  Never done    ZOSTER IMMUNIZATION (1 of 2) Never done    LUNG CANCER SCREENING  Never done    YEARLY PREVENTIVE VISIT  02/09/2022    PHQ-2 (once per calendar year)  01/01/2023    BMP  01/15/2023    ANNUAL REVIEW OF HM ORDERS  06/22/2023    CBC  06/22/2023    NICOTINE/TOBACCO CESSATION COUNSELING Q 1 YR  06/22/2023    ALT  07/15/2023    LIPID  07/15/2023    TSH W/FREE T4 REFLEX  07/15/2023       Please call us at [phone] (or use [employer]) to address the above recommendations.     Thank you for trusting [facility] and we appreciate the opportunity to serve you.  We look forward to supporting your healthcare needs in the future.    Healthy Regards,    [place] Care Team/lf

## 2023-06-15 NOTE — TELEPHONE ENCOUNTER
Summary:    Patient is due/failing the following:   COLONOSCOPY, MAMMOGRAM and PAP    Reviewed:    [] CARE EVERYWHERE  [] LAST OV NOTE   [] FYI TAB  [] MYCHART ACTIVE?  [] LAST PANEL ENCOUNTER  [] FUTURE APPTS  [] IMMUNIZATIONS  [] Media Tab            Action needed:   Patient needs office visit for pap and mammo. and Patient needs referral/order: colonoscopy    Type of outreach:    Phone, left message for patient to call back.  and Sent [employer] message.                                                                               Michelle Oliva/TEA  [place]---[facility]

## 2023-07-27 DIAGNOSIS — I10 ESSENTIAL HYPERTENSION: ICD-10-CM

## 2023-07-27 RX ORDER — CHLORTHALIDONE 25 MG/1
25 TABLET ORAL DAILY
Qty: 30 TABLET | Refills: 0 | Status: SHIPPED | OUTPATIENT
Start: 2023-07-27 | End: 2023-08-31

## 2023-07-27 NOTE — LETTER
July 31, 2023      Lulú Razo  [address] [address]        Lulú Razo      We recently received a refill request for your    chlorthalidone (HYGROTON) 25 MG tablet   . We have sent in a refill for you to your pharmacy.    Our records show you are due for a follow up visit with your provider.     Please call the clinic at [phone] to schedule as the providers are booking out.        Sincerely,      Allyssa Jefferson/

## 2023-07-27 NOTE — TELEPHONE ENCOUNTER
Routing refill request to provider for review/approval because:  Drug not on the [facility] refill protocol   BP Readings from Last 3 Encounters:   10/29/22 (!) 151/117   08/01/22 (!) 146/88   07/15/22 139/85

## 2023-07-27 NOTE — TELEPHONE ENCOUNTER
Refill request reviewed.  Patient has had poorly controlled hypertension per review of medical record on last 2 readings.  Patient has not had a comprehensive exam by primary care provider in over 1 year.  Refill given for 30 days, during which time patient needs to be seen by me or other primary care provider for continued safe medication management.  Follow-up evaluation of her prediabetes should be done at next visit, which should ideally be a prevent this.  One of my provider approval appointments may be used, or she may be seen by provider if needed.  Isreal Kumari, DO on 7/27/2023 at 3:46 PM

## 2023-08-31 ENCOUNTER — MYC REFILL (OUTPATIENT)
Dept: FAMILY MEDICINE | Facility: CLINIC | Age: 58
End: 2023-08-31
Payer: COMMERCIAL

## 2023-08-31 DIAGNOSIS — I10 ESSENTIAL HYPERTENSION: ICD-10-CM

## 2023-08-31 RX ORDER — CHLORTHALIDONE 25 MG/1
25 TABLET ORAL DAILY
Qty: 90 TABLET | Refills: 0 | Status: SHIPPED | OUTPATIENT
Start: 2023-08-31 | End: 2023-11-22

## 2023-11-19 ENCOUNTER — MYC REFILL (OUTPATIENT)
Dept: FAMILY MEDICINE | Facility: CLINIC | Age: 58
End: 2023-11-19
Payer: COMMERCIAL

## 2023-11-19 DIAGNOSIS — I10 ESSENTIAL HYPERTENSION: ICD-10-CM

## 2023-11-20 RX ORDER — CHLORTHALIDONE 25 MG/1
25 TABLET ORAL DAILY
Qty: 90 TABLET | Refills: 0 | OUTPATIENT
Start: 2023-11-20

## 2023-11-21 ENCOUNTER — TELEPHONE (OUTPATIENT)
Dept: FAMILY MEDICINE | Facility: CLINIC | Age: 58
End: 2023-11-21
Payer: COMMERCIAL

## 2023-11-21 NOTE — TELEPHONE ENCOUNTER
Leaving for [place] in 3 months. Said  said she needs to be seen before she leaves. Unable to have  find appointment before she leaves. Scheduled for tomorrow with Susan Haase, NP.     ,radha

## 2023-11-21 NOTE — TELEPHONE ENCOUNTER
Call received, due to technical difficulties I needed to call her back, she was in agreement, tried x 3 unable to reach, left message that I will return call in 5 minutes.       I waited 5 min and tried again 3 times, unable to connect  as phone goes right to voice mail. left Lulú message to return call for clinic and apologized for not being able to get through. Christal Paula R.N.

## 2023-11-21 NOTE — TELEPHONE ENCOUNTER
Patient advised, states she has very limited availability between the holiday this week and traveling again.  She is open to checking other clinics for availability.  Transferred to scheduling.  ANTHONY Montgomery R.N.

## 2023-11-22 ENCOUNTER — OFFICE VISIT (OUTPATIENT)
Dept: FAMILY MEDICINE | Facility: CLINIC | Age: 58
End: 2023-11-22
Payer: COMMERCIAL

## 2023-11-22 VITALS
DIASTOLIC BLOOD PRESSURE: 82 MMHG | HEART RATE: 105 BPM | WEIGHT: 186.2 LBS | TEMPERATURE: 98.5 F | OXYGEN SATURATION: 97 % | RESPIRATION RATE: 16 BRPM | HEIGHT: 64 IN | BODY MASS INDEX: 31.79 KG/M2 | SYSTOLIC BLOOD PRESSURE: 136 MMHG

## 2023-11-22 DIAGNOSIS — I10 ESSENTIAL HYPERTENSION: Primary | ICD-10-CM

## 2023-11-22 PROBLEM — E66.812 CLASS 2 OBESITY DUE TO EXCESS CALORIES WITHOUT SERIOUS COMORBIDITY WITH BODY MASS INDEX (BMI) OF 35.0 TO 35.9 IN ADULT: Status: RESOLVED | Noted: 2021-02-09 | Resolved: 2023-11-22

## 2023-11-22 PROBLEM — E66.09 CLASS 2 OBESITY DUE TO EXCESS CALORIES WITHOUT SERIOUS COMORBIDITY WITH BODY MASS INDEX (BMI) OF 35.0 TO 35.9 IN ADULT: Status: RESOLVED | Noted: 2021-02-09 | Resolved: 2023-11-22

## 2023-11-22 PROCEDURE — 99214 OFFICE O/P EST MOD 30 MIN: CPT | Performed by: NURSE PRACTITIONER

## 2023-11-22 RX ORDER — CHLORTHALIDONE 25 MG/1
25 TABLET ORAL DAILY
Qty: 90 TABLET | Refills: 1 | Status: SHIPPED | OUTPATIENT
Start: 2023-11-22 | End: 2024-06-14

## 2023-11-22 ASSESSMENT — PAIN SCALES - GENERAL: PAINLEVEL: NO PAIN (0)

## 2023-11-22 NOTE — PROGRESS NOTES
"  Assessment & Plan     Essential hypertension:  /82, well controlled, continue on chlorthalidone 25 mg daily.     - chlorthalidone (HYGROTON) 25 MG tablet  Dispense: 90 tablet; Refill: 1  - CBC with platelets  - Comprehensive metabolic panel  - Lipid panel reflex to direct LDL Fasting       Nicotine/Tobacco Cessation:  She reports that she has been smoking cigarettes. She has been smoking an average of 0.50 packs per day. She has never used smokeless tobacco.  Nicotine/Tobacco Cessation Plan:   Not discussed.      BMI:   Estimated body mass index is 31.71 kg/m  as calculated from the following:    Height as of this encounter: 1.632 m (5' 4.25\").    Weight as of this encounter: 84.5 kg (186 lb 3.2 oz).   Weight management plan: Discussed healthy diet and exercise guidelines    FUTURE APPOINTMENTS:       - Follow-up visit for fasting labs on 11/24    Susan Haase, APRN CNP [facility] HospitalSHIRLEY Chino is a 58 year old, presenting for the following health issues:  Recheck Medication      11/22/2023     2:25 PM   Additional Questions   Roomed by MELVA Young   Accompanied by Self       History of Present Illness       Reason for visit:  Medication      HTN:  taking chlorthalidone 25 mg daily.  Does not check BP at home.  Denies headache, chest pain, palpitations.  Has stopped caffeine, and tries to eat a low sodium diet.   Weight loss:  in the last year has lost 20 lbs by decreasing processed food in her diet and increasing exercise.          Review of Systems   CONSTITUTIONAL: NEGATIVE for fever, chills, change in weight  ENT/MOUTH: NEGATIVE for ear, mouth and throat problems  RESP: NEGATIVE for significant cough or SOB  CV: NEGATIVE for chest pain, palpitations or peripheral edema      Objective    /82   Pulse 105   Temp 98.5  F (36.9  C) (Oral)   Resp 16   Ht 1.632 m (5' 4.25\")   Wt 84.5 kg (186 lb 3.2 oz)   LMP  (LMP Unknown)   SpO2 97%   Breastfeeding No   " BMI 31.71 kg/m    Body mass index is 31.71 kg/m .  Physical Exam   GENERAL: healthy, alert and no distress  HENT: ear canals and TM's normal, nose and mouth without ulcers or lesions  NECK: no adenopathy, no asymmetry, masses, or scars and thyroid normal to palpation  RESP: lungs clear to auscultation - no rales, rhonchi or wheezes  CV: regular rate and rhythm, normal S1 S2, no S3 or S4, no murmur, click or rub, no peripheral edema  PSYCH: mentation appears normal, affect normal/bright                    Answers submitted by the patient for this visit:  General Questionnaire (Submitted on 11/22/2023)  Chief Complaint: Chronic problems general questions HPI Form  What is the reason for your visit today? : Medication

## 2023-11-27 ENCOUNTER — OFFICE VISIT (OUTPATIENT)
Dept: URGENT CARE | Facility: URGENT CARE | Age: 58
End: 2023-11-27
Payer: COMMERCIAL

## 2023-11-27 VITALS
HEIGHT: 64 IN | HEART RATE: 124 BPM | DIASTOLIC BLOOD PRESSURE: 94 MMHG | WEIGHT: 186 LBS | BODY MASS INDEX: 31.76 KG/M2 | OXYGEN SATURATION: 95 % | TEMPERATURE: 98.1 F | RESPIRATION RATE: 16 BRPM | SYSTOLIC BLOOD PRESSURE: 156 MMHG

## 2023-11-27 DIAGNOSIS — I10 ELEVATED BLOOD PRESSURE READING IN OFFICE WITH DIAGNOSIS OF HYPERTENSION: ICD-10-CM

## 2023-11-27 DIAGNOSIS — M54.2 NECK PAIN: Primary | ICD-10-CM

## 2023-11-27 PROCEDURE — 99214 OFFICE O/P EST MOD 30 MIN: CPT | Performed by: PHYSICIAN ASSISTANT

## 2023-11-27 ASSESSMENT — PAIN SCALES - GENERAL: PAINLEVEL: SEVERE PAIN (6)

## 2023-11-27 NOTE — PATIENT INSTRUCTIONS
Patient was educated on the natural course of injury.  A radiologist will read your x-ray.  If there is a discrepancy in the interpretation of the x-ray you will be notified. Conservative measures discussed including rest, heat, massage, neck stretches, and over-the-counter analgesics (Tylenol or Ibuprofen) as needed. See your primary care provider if symptoms worsen or do not improve in 7 days. Seek emergency care if you develop severe pain/swelling, inability to move extremity, skin paleness, or weakness.

## 2023-11-27 NOTE — PROGRESS NOTES
"URGENT CARE VISIT:    SUBJECTIVE:   Chief Complaint   Patient presents with    Urgent Care     Was seen end of last week, had some ear wax in her right ear, provider used a curette to try and scoop it out.  Has had terrible pain at the base of her skull since.  Is leaving on a plane tomorrow and wants to make sure her ear is ok.         Lulú Razo is a 58 year old female who presents with a chief complaint of right neck pain.  Symptoms began 1 week(s) ago, are mild and sudden onset  No known injury. She did get ear wax scooped out last week.  Pain exacerbated by movement and touch. Relieved by rest.  She treated it initially with no therapy. This is the first time this type of injury has occurred to this patient.     PMH: No past medical history on file.  Allergies: Quinolones   Medications:   Current Outpatient Medications   Medication Sig Dispense Refill    chlorthalidone (HYGROTON) 25 MG tablet Take 1 tablet (25 mg) by mouth daily 90 tablet 1     Social History:   Social History     Tobacco Use    Smoking status: Some Days     Packs/day: 0.50     Years: 0.00     Additional pack years: 0.00     Total pack years: 0.00     Types: Cigarettes    Smokeless tobacco: Never    Tobacco comments:     2x day.. wants to quiet   Substance Use Topics    Alcohol use: Not Currently       ROS:  General: negative  Skin: negative  Eyes: negative  Ears/Nose/Throat: negative  Respiratory: negative  Cardiovascular: negative  Gastrointestinal: negative  Genitourinary: negative  Musculoskeletal: neck pain  Neurologic: negative      OBJECTIVE:  BP (!) 156/94 (BP Location: Right arm, Patient Position: Sitting, Cuff Size: Adult Regular)   Pulse (!) 124   Temp 98.1  F (36.7  C) (Oral)   Resp 16   Ht 1.626 m (5' 4\")   Wt 84.4 kg (186 lb)   LMP  (LMP Unknown)   SpO2 95%   BMI 31.93 kg/m    GENERAL APPEARANCE: healthy, alert and no distress  NECK: no lymphadenopathy  ENT: normal Tms  MUSCULOSKELETAL: mild TTP over right superior " sternocleidomastoid muscle  EXTREMITIES: peripheral pulses normal  SKIN: no rashes  NEURO: sensation intact. CN 2 to 12 intact  PSYCH: anxious      ASSESSMENT:    ICD-10-CM    1. Neck pain  M54.2       2. Elevated blood pressure reading in office with diagnosis of hypertension  I10           PLAN:  Patient Instructions   Patient was educated on the natural course of injury.  A radiologist will read your x-ray.  If there is a discrepancy in the interpretation of the x-ray you will be notified. Conservative measures discussed including rest, heat, massage, neck stretches, and over-the-counter analgesics (Tylenol or Ibuprofen) as needed. See your primary care provider if symptoms worsen or do not improve in 7 days. Seek emergency care if you develop severe pain/swelling, inability to move extremity, skin paleness, or weakness.     Patient verbalized understanding and is agreeable to plan. The patient was discharged ambulatory and in stable condition.    Elizabeth Lemos PA-C on 11/27/2023 at 3:22 PM

## 2024-02-25 ENCOUNTER — HEALTH MAINTENANCE LETTER (OUTPATIENT)
Age: 59
End: 2024-02-25

## 2024-06-14 DIAGNOSIS — I10 ESSENTIAL HYPERTENSION: ICD-10-CM

## 2024-06-17 RX ORDER — CHLORTHALIDONE 25 MG/1
25 TABLET ORAL DAILY
Qty: 90 TABLET | Refills: 0 | Status: SHIPPED | OUTPATIENT
Start: 2024-06-17

## 2024-06-26 ENCOUNTER — MYC MEDICAL ADVICE (OUTPATIENT)
Dept: FAMILY MEDICINE | Facility: CLINIC | Age: 59
End: 2024-06-26
Payer: COMMERCIAL

## 2024-06-26 NOTE — TELEPHONE ENCOUNTER
Patient Quality Outreach    Patient is due for the following:   Breast Cancer Screening - Mammogram    Next Steps:   No follow up needed at this time.    Type of outreach:    Sent [employer] message.    Next Steps:  Reach out within 90 days via [employer].    Max number of attempts reached: No. Will try again in 90 days if patient still on fail list.    Questions for provider review:    None           Nikia Ochoa CMA  Chart routed to Care Team.

## 2024-07-14 ENCOUNTER — HEALTH MAINTENANCE LETTER (OUTPATIENT)
Age: 59
End: 2024-07-14

## 2024-07-15 ENCOUNTER — TELEPHONE (OUTPATIENT)
Dept: FAMILY MEDICINE | Facility: CLINIC | Age: 59
End: 2024-07-15
Payer: COMMERCIAL

## 2024-07-15 NOTE — TELEPHONE ENCOUNTER
Summary:    Patient is due/failing the following:   PAP    Reviewed:    [] CARE EVERYWHERE  [] LAST OV NOTE   [] FYI TAB  [] MYCHART ACTIVE?  [] LAST PANEL ENCOUNTER  [] FUTURE APPTS  [] IMMUNIZATIONS  [] Media Tab            Action needed:   Patient needs office visit for pap.    Type of outreach:    Phone, left message for patient to call back.  and Sent MyChart message.                                                                               Michelle Oliva/TEA  [place]---[facility]

## 2024-07-22 ENCOUNTER — TELEPHONE (OUTPATIENT)
Dept: FAMILY MEDICINE | Facility: CLINIC | Age: 59
End: 2024-07-22
Payer: COMMERCIAL

## 2024-07-22 NOTE — TELEPHONE ENCOUNTER
Summary:    Patient is due/failing the following:   MAMMOGRAM    Reviewed:    [] CARE EVERYWHERE  [] LAST OV NOTE   [] FYI TAB  [] MYCHART ACTIVE?  [] LAST PANEL ENCOUNTER  [] FUTURE APPTS  [] IMMUNIZATIONS  [] Media Tab            Action needed:   Patient needs referral/order: mammo    Type of outreach:    Phone, left message for patient to call back.  and Sent MyChart message.                                                                               Michelle Oliva/TEA  [place]---[facility]

## 2024-10-01 ENCOUNTER — TELEPHONE (OUTPATIENT)
Dept: FAMILY MEDICINE | Facility: CLINIC | Age: 59
End: 2024-10-01
Payer: COMMERCIAL

## 2024-10-01 NOTE — TELEPHONE ENCOUNTER
Summary:    Patient is due/failing the following:   PAP    Reviewed:    [] CARE EVERYWHERE  [] LAST OV NOTE   [] FYI TAB  [] MYCHART ACTIVE?  [] LAST PANEL ENCOUNTER  [] FUTURE APPTS  [] IMMUNIZATIONS  [] Media Tab            Action needed:   Patient needs office visit for pap.    Type of outreach:    Phone, left message for patient to call back.  and Sent MyChart message.                                                                               Michelle Oliva/TEA  [place]---[facility]

## 2024-10-10 ENCOUNTER — MYC REFILL (OUTPATIENT)
Dept: FAMILY MEDICINE | Facility: CLINIC | Age: 59
End: 2024-10-10
Payer: COMMERCIAL

## 2024-10-10 DIAGNOSIS — I10 ESSENTIAL HYPERTENSION: ICD-10-CM

## 2024-10-10 DIAGNOSIS — R73.9 ELEVATED SERUM GLUCOSE: ICD-10-CM

## 2024-10-10 DIAGNOSIS — Z13.220 SCREENING FOR HYPERLIPIDEMIA: Primary | ICD-10-CM

## 2024-10-10 RX ORDER — CHLORTHALIDONE 25 MG/1
25 TABLET ORAL DAILY
Qty: 90 TABLET | Refills: 0 | Status: SHIPPED | OUTPATIENT
Start: 2024-10-10

## 2025-01-12 ENCOUNTER — MYC REFILL (OUTPATIENT)
Dept: FAMILY MEDICINE | Facility: CLINIC | Age: 60
End: 2025-01-12
Payer: COMMERCIAL

## 2025-01-12 DIAGNOSIS — I10 ESSENTIAL HYPERTENSION: ICD-10-CM

## 2025-01-13 RX ORDER — CHLORTHALIDONE 25 MG/1
25 TABLET ORAL DAILY
Qty: 90 TABLET | Refills: 0 | Status: SHIPPED | OUTPATIENT
Start: 2025-01-13

## 2025-04-21 ENCOUNTER — LAB (OUTPATIENT)
Dept: LAB | Facility: CLINIC | Age: 60
End: 2025-04-21
Payer: COMMERCIAL

## 2025-04-21 DIAGNOSIS — R73.9 ELEVATED SERUM GLUCOSE: ICD-10-CM

## 2025-04-21 DIAGNOSIS — I10 ESSENTIAL HYPERTENSION: ICD-10-CM

## 2025-04-21 DIAGNOSIS — Z13.220 SCREENING FOR HYPERLIPIDEMIA: ICD-10-CM

## 2025-04-21 LAB
EST. AVERAGE GLUCOSE BLD GHB EST-MCNC: 240 MG/DL
HBA1C MFR BLD: 10 % (ref 0–5.6)

## 2025-04-21 PROCEDURE — 83036 HEMOGLOBIN GLYCOSYLATED A1C: CPT

## 2025-04-21 PROCEDURE — 80048 BASIC METABOLIC PNL TOTAL CA: CPT

## 2025-04-21 PROCEDURE — 80061 LIPID PANEL: CPT

## 2025-04-21 PROCEDURE — 36415 COLL VENOUS BLD VENIPUNCTURE: CPT

## 2025-04-22 LAB
ANION GAP SERPL CALCULATED.3IONS-SCNC: 13 MMOL/L (ref 7–15)
BUN SERPL-MCNC: 10.5 MG/DL (ref 6–20)
CALCIUM SERPL-MCNC: 10.6 MG/DL (ref 8.8–10.4)
CHLORIDE SERPL-SCNC: 97 MMOL/L (ref 98–107)
CHOLEST SERPL-MCNC: 243 MG/DL
CREAT SERPL-MCNC: 0.63 MG/DL (ref 0.51–0.95)
EGFRCR SERPLBLD CKD-EPI 2021: >90 ML/MIN/1.73M2
FASTING STATUS PATIENT QL REPORTED: YES
FASTING STATUS PATIENT QL REPORTED: YES
GLUCOSE SERPL-MCNC: 248 MG/DL (ref 70–99)
HCO3 SERPL-SCNC: 29 MMOL/L (ref 22–29)
HDLC SERPL-MCNC: 52 MG/DL
LDLC SERPL CALC-MCNC: 155 MG/DL
NONHDLC SERPL-MCNC: 191 MG/DL
POTASSIUM SERPL-SCNC: 3.6 MMOL/L (ref 3.4–5.3)
SODIUM SERPL-SCNC: 139 MMOL/L (ref 135–145)
TRIGL SERPL-MCNC: 179 MG/DL

## 2025-04-23 NOTE — RESULT ENCOUNTER NOTE
Result(s) was/were reviewed with pt in [employer] message - see refill request enc from 4/22 for details.  Electronically Signed By: Sharda Romo PA-C

## 2025-05-06 ENCOUNTER — PATIENT OUTREACH (OUTPATIENT)
Dept: CARE COORDINATION | Facility: CLINIC | Age: 60
End: 2025-05-06
Payer: COMMERCIAL

## 2025-05-08 ENCOUNTER — PATIENT OUTREACH (OUTPATIENT)
Dept: CARE COORDINATION | Facility: CLINIC | Age: 60
End: 2025-05-08
Payer: COMMERCIAL

## 2025-07-19 ENCOUNTER — HEALTH MAINTENANCE LETTER (OUTPATIENT)
Age: 60
End: 2025-07-19

## 2025-08-09 ENCOUNTER — HEALTH MAINTENANCE LETTER (OUTPATIENT)
Age: 60
End: 2025-08-09